# Patient Record
Sex: FEMALE | Race: OTHER | Employment: UNEMPLOYED | URBAN - METROPOLITAN AREA
[De-identification: names, ages, dates, MRNs, and addresses within clinical notes are randomized per-mention and may not be internally consistent; named-entity substitution may affect disease eponyms.]

---

## 2019-10-29 ENCOUNTER — HOSPITAL ENCOUNTER (OUTPATIENT)
Dept: LAB | Age: 29
Discharge: HOME | End: 2019-10-29
Payer: OTHER GOVERNMENT

## 2019-10-29 ENCOUNTER — OFFICE VISIT (OUTPATIENT)
Dept: FAMILY MEDICINE CLINIC | Age: 29
End: 2019-10-29

## 2019-10-29 VITALS
BODY MASS INDEX: 25.95 KG/M2 | DIASTOLIC BLOOD PRESSURE: 76 MMHG | OXYGEN SATURATION: 97 % | HEIGHT: 62 IN | HEART RATE: 66 BPM | TEMPERATURE: 99.2 F | RESPIRATION RATE: 16 BRPM | WEIGHT: 141 LBS | SYSTOLIC BLOOD PRESSURE: 108 MMHG

## 2019-10-29 DIAGNOSIS — R53.82 CHRONIC FATIGUE: ICD-10-CM

## 2019-10-29 DIAGNOSIS — Z23 ENCOUNTER FOR IMMUNIZATION: Primary | ICD-10-CM

## 2019-10-29 DIAGNOSIS — D69.6 THROMBOCYTOPENIA (HCC): ICD-10-CM

## 2019-10-29 DIAGNOSIS — E06.3 HASHIMOTO'S THYROIDITIS: ICD-10-CM

## 2019-10-29 DIAGNOSIS — R87.619 ABNORMAL CERVICAL PAPANICOLAOU SMEAR, UNSPECIFIED ABNORMAL PAP FINDING: ICD-10-CM

## 2019-10-29 LAB
25(OH)D3 SERPL-MCNC: 14.1 NG/ML (ref 30–100)
ALBUMIN SERPL-MCNC: 4.2 G/DL (ref 3.4–5)
ALBUMIN/GLOB SERPL: 1.1 {RATIO} (ref 0.8–1.7)
ALP SERPL-CCNC: 70 U/L (ref 45–117)
ALT SERPL-CCNC: 22 U/L (ref 13–56)
ANION GAP SERPL CALC-SCNC: 5 MMOL/L (ref 3–18)
AST SERPL-CCNC: 18 U/L (ref 10–38)
BILIRUB SERPL-MCNC: 0.4 MG/DL (ref 0.2–1)
BUN SERPL-MCNC: 13 MG/DL (ref 7–18)
BUN/CREAT SERPL: 16 (ref 12–20)
CALCIUM SERPL-MCNC: 9.1 MG/DL (ref 8.5–10.1)
CHLORIDE SERPL-SCNC: 106 MMOL/L (ref 100–111)
CO2 SERPL-SCNC: 29 MMOL/L (ref 21–32)
CREAT SERPL-MCNC: 0.8 MG/DL (ref 0.6–1.3)
ERYTHROCYTE [DISTWIDTH] IN BLOOD BY AUTOMATED COUNT: 12.6 % (ref 11.6–14.5)
GLOBULIN SER CALC-MCNC: 3.7 G/DL (ref 2–4)
GLUCOSE SERPL-MCNC: 79 MG/DL (ref 74–99)
HCT VFR BLD AUTO: 44.6 % (ref 35–45)
HGB BLD-MCNC: 14.8 G/DL (ref 12–16)
MCH RBC QN AUTO: 30.3 PG (ref 24–34)
MCHC RBC AUTO-ENTMCNC: 33.2 G/DL (ref 31–37)
MCV RBC AUTO: 91.2 FL (ref 74–97)
PLATELET # BLD AUTO: 230 K/UL (ref 135–420)
PMV BLD AUTO: 11.7 FL (ref 9.2–11.8)
POTASSIUM SERPL-SCNC: 3.9 MMOL/L (ref 3.5–5.5)
PROT SERPL-MCNC: 7.9 G/DL (ref 6.4–8.2)
RBC # BLD AUTO: 4.89 M/UL (ref 4.2–5.3)
SODIUM SERPL-SCNC: 140 MMOL/L (ref 136–145)
T4 FREE SERPL-MCNC: 0.8 NG/DL (ref 0.7–1.5)
TSH SERPL DL<=0.05 MIU/L-ACNC: 3.24 UIU/ML (ref 0.36–3.74)
WBC # BLD AUTO: 7.7 K/UL (ref 4.6–13.2)

## 2019-10-29 PROCEDURE — 80053 COMPREHEN METABOLIC PANEL: CPT

## 2019-10-29 PROCEDURE — 36415 COLL VENOUS BLD VENIPUNCTURE: CPT

## 2019-10-29 PROCEDURE — 85027 COMPLETE CBC AUTOMATED: CPT

## 2019-10-29 PROCEDURE — 84439 ASSAY OF FREE THYROXINE: CPT

## 2019-10-29 PROCEDURE — 84443 ASSAY THYROID STIM HORMONE: CPT

## 2019-10-29 PROCEDURE — 82306 VITAMIN D 25 HYDROXY: CPT

## 2019-10-29 RX ORDER — UREA 10 %
LOTION (ML) TOPICAL
COMMUNITY
End: 2022-02-26

## 2019-10-29 NOTE — PROGRESS NOTES
Chief Complaint   Patient presents with    Establish Care    Thyroid Problem    Neck Pain     ongoing for long time       Starting the testing process with regards to Thyroid, but has since moved from [place]. Patient presents for flu vaccine. Consent obtained, Tolerated procedure well at right deltoid. Patient remained in office 10 minutes post vaccine. No side effects noted.      Lot #  [id number]  exp 06/30/20  [address] [place] [address]. [address]: [id number]

## 2019-10-29 NOTE — PATIENT INSTRUCTIONS
Influenza (Flu) Vaccine (Inactivated or Recombinant): What You Need to Know Why get vaccinated? Influenza (\"flu\") is a contagious disease that spreads around the [place] every winter, usually between October and May. Flu is caused by influenza viruses and is spread mainly by coughing, sneezing, and close contact. Anyone can get flu. Flu strikes suddenly and can last several days. Symptoms vary by age, but can include: · Fever/chills. · Sore throat. · Muscle aches. · Fatigue. · Cough. · Headache. · Runny or stuffy nose. Flu can also lead to pneumonia and blood infections, and cause diarrhea and seizures in children. If you have a medical condition, such as heart or lung disease, flu can make it worse. Flu is more dangerous for some people. Infants and young children, people 72years of age and older, pregnant women, and people with certain health conditions or a weakened immune system are at greatest risk. Each year thousands of people in the [facility] die from flu, and many more are hospitalized. Flu vaccine can: · Keep you from getting flu. · Make flu less severe if you do get it. · Keep you from spreading flu to your family and other people. Inactivated and recombinant flu vaccines A dose of flu vaccine is recommended every flu season. Children 6 months through 6years of age may need two doses during the same flu season. Everyone else needs only one dose each flu season. Some inactivated flu vaccines contain a very small amount of a mercury-based preservative called thimerosal. Studies have not shown thimerosal in vaccines to be harmful, but flu vaccines that do not contain thimerosal are available. There is no live flu virus in flu shots. They cannot cause the flu. There are many flu viruses, and they are always changing. Each year a new flu vaccine is made to protect against three or four viruses that are likely to cause disease in the upcoming flu season.  But even when the vaccine doesn't exactly match these viruses, it may still provide some protection. Flu vaccine cannot prevent: · Flu that is caused by a virus not covered by the vaccine. · Illnesses that look like flu but are not. Some people should not get this vaccine Tell the person who is giving you the vaccine: · If you have any severe (life-threatening) allergies. If you ever had a life-threatening allergic reaction after a dose of flu vaccine, or have a severe allergy to any part of this vaccine, you may be advised not to get vaccinated. Most, but not all, types of flu vaccine contain a small amount of egg protein. · If you ever had Guillain-Barré syndrome (also called GBS) Some people with a history of GBS should not get this vaccine. This should be discussed with your doctor. · If you are not feeling well. It is usually okay to get flu vaccine when you have a mild illness, but you might be asked to come back when you feel better. Risks of a vaccine reaction With any medicine, including vaccines, there is a chance of reactions. These are usually mild and go away on their own, but serious reactions are also possible. Most people who get a flu shot do not have any problems with it. Minor problems following a flu shot include: · Soreness, redness, or swelling where the shot was given · Hoarseness · Sore, red or itchy eyes · Cough · Fever · Aches · Headache · Itching · Fatigue If these problems occur, they usually begin soon after the shot and last 1 or 2 days. More serious problems following a flu shot can include the following: · There may be a small increased risk of Guillain-Barré Syndrome (GBS) after inactivated flu vaccine. This risk has been estimated at 1 or 2 additional cases per million people vaccinated. This is much lower than the risk of severe complications from flu, which can be prevented by flu vaccine.  
· Nicholas Velez children who get the flu shot along with pneumococcal vaccine (PCV13) and/or DTaP vaccine at the same time might be slightly more likely to have a seizure caused by fever. Ask your doctor for more information. Tell your doctor if a child who is getting flu vaccine has ever had a seizure Problems that could happen after any injected vaccine: · People sometimes faint after a medical procedure, including vaccination. Sitting or lying down for about 15 minutes can help prevent fainting, and injuries caused by a fall. Tell your doctor if you feel dizzy, or have vision changes or ringing in the ears. · Some people get severe pain in the shoulder and have difficulty moving the arm where a shot was given. This happens very rarely. · Any medication can cause a severe allergic reaction. Such reactions from a vaccine are very rare, estimated at about 1 in a million doses, and would happen within a few minutes to a few hours after the vaccination. As with any medicine, there is a very remote chance of a vaccine causing a serious injury or death. The safety of vaccines is always being monitored. For more information, visit: www.cdc.gov/vaccinesafety/. What if there is a serious reaction? What should I look for? · Look for anything that concerns you, such as signs of a severe allergic reaction, very high fever, or unusual behavior. Signs of a severe allergic reaction can include hives, swelling of the face and throat, difficulty breathing, a fast heartbeat, dizziness, and weakness  usually within a few minutes to a few hours after the vaccination. What should I do? · If you think it is a severe allergic reaction or other emergency that can't wait, call 9-1-1 and get the person to the nearest hospital. Otherwise, call your doctor. · Reactions should be reported to the \"Vaccine Adverse Event Reporting System\" (VAERS). Your doctor should file this report, or you can do it yourself through the VAERS website at www.vaers. hhs.gov, or by calling 4-737.805.8729. [facility] does not give medical advice. The National Vaccine Injury Compensation Program 
The National Vaccine Injury Compensation Program (VICP) is a federal program that was created to compensate people who may have been injured by certain vaccines. Persons who believe they may have been injured by a vaccine can learn about the program and about filing a claim by calling 7-354.184.4595 or visiting the [address] website at www.[facility].gov/vaccinecompensation. There is a time limit to file a claim for compensation. How can I learn more? · Ask your healthcare provider. He or she can give you the vaccine package insert or suggest other sources of information. · Call your local or state health department. · Contact the Centers for Disease Control and Prevention (CDC): 
? Call 1-669.962.1711 (1-800-CDC-INFO) or 
? Visit CDC's website at www.cdc.gov/flu Vaccine Information Statement Inactivated Influenza Vaccine 8/7/2015) [address] LINDSAY Otoole Darden 110RM-16 [facility], [facility] and [address] Centers for Disease Control and Prevention Many Vaccine Information Statements are available in [place] and other languages. See www.immunize.org/vis. Muchas hojas de información sobre vacunas están disponibles en español y en otros idiomas. Visite www.immunize.org/vis. Care instructions adapted under license by [employer] (which disclaims liability or warranty for this information). If you have questions about a medical condition or this instruction, always ask your healthcare professional. [place] [address] any warranty or liability for your use of this information. Abnormal Pap Test: Care Instructions Your Care Instructions A Pap test (also called a Pap smear) is used to look for early changes that may become cancer of the cervix. Your Pap test was abnormal. That may mean that some cells in your cervix have changed. The cell changes are most often caused by human papillomavirus (HPV) infection. An abnormal Pap test does not mean that the abnormal cells will lead to cancer. Changes in cervical cells may go away on their own or may progress slowly. Your doctor may want to repeat the Pap test or have you take other tests to see if you have cell changes. It is very important that you have regular Pap tests after you've had an abnormal Pap test. 
Follow-up care is a key part of your treatment and safety. Be sure to make and go to all appointments, and call your doctor if you are having problems. It's also a good idea to know your test results and keep a list of the medicines you take. How can you care for yourself at home? · Do not smoke. Smoking may increase your risk for cervical cell changes. If you need help quitting, talk to your doctor about stop-smoking programs and medicines. These can increase your chances of quitting for good. · Be sure to get follow-up Pap tests or other follow-up tests as recommended by your doctor. When should you call for help? Watch closely for changes in your health, and be sure to contact your doctor if you have any problems. Where can you learn more? Go to http://desire-kevin.info/. Enter P925 in the search box to learn more about \"Abnormal Pap Test: Care Instructions. \" Current as of: December 19, 2018 Content Version: 12.2 © 4041-9824 [employer]. Care instructions adapted under license by [employer] (which disclaims liability or warranty for this information). If you have questions about a medical condition or this instruction, always ask your healthcare professional. [place] [address] any warranty or liability for your use of this information. Colposcopy: What to Expect at [facility] Your Recovery You may feel some soreness in your vagina for a day or two if you had a biopsy.  Some vaginal bleeding or discharge is normal for up to a week after a biopsy. The discharge may be dark-colored if a solution was put on your cervix. You can use a sanitary pad for the bleeding. It may take a week or two for you to get the test results. This care sheet gives you a general idea about how long it will take for you to recover. But each person recovers at a different pace. Follow the steps below to feel better as quickly as possible. How can you care for yourself at home? Activity 
  · You can return to work and most daily activities right after the test.  
Exercise 
  · Do not exercise for 1 day after the test.  
Medicines 
  · Your doctor will tell you if and when you can restart your medicines. He or she will also give you instructions about taking any new medicines.  
  · If you take blood thinners, such as warfarin (Coumadin), clopidogrel (Plavix), or aspirin, be sure to talk to your doctor. He or she will tell you if and when to start taking those medicines again. Make sure that you understand exactly what your doctor wants you to do.  
  · Take an over-the-counter pain medicine, such as acetaminophen (Tylenol), ibuprofen (Advil, Motrin), or naproxen (Aleve). Be safe with medicines. Read and follow all instructions on the label. Do not take two or more pain medicines at the same time unless the doctor told you to. Many pain medicines have acetaminophen, which is Tylenol. Too much acetaminophen (Tylenol) can be harmful. Other instructions 
  · Use a pad if you have some bleeding.  
  · Do not douche, have sexual intercourse, or use tampons for 1 week if you had a biopsy. This will allow time for your cervix to heal.  
  · You can take a bath or shower anytime after the test.  
Follow-up care is a key part of your treatment and safety. Be sure to make and go to all appointments, and call your doctor if you are having problems. It's also a good idea to know your test results and keep a list of the medicines you take. When should you call for help? Call your doctor now or seek immediate medical care if: 
  · You have severe vaginal bleeding. This means that you are soaking through your usual pads or tampons each hour for 2 or more hours.  
  · You have pain that does not get better after you take pain medicine.  
  · You have signs of infection, such as: 
? Increased pain. ? Bad-smelling vaginal discharge. ? A fever.  
 Watch closely for any changes in your health, and be sure to contact your doctor if: 
  · You have questions or concerns. Where can you learn more? Go to http://desire-kevin.info/. Enter M523 in the search box to learn more about \"Colposcopy: What to Expect at Home. \" Current as of: December 19, 2018 Content Version: 12.2 © 1223-9992 [employer]. Care instructions adapted under license by [employer] (which disclaims liability or warranty for this information). If you have questions about a medical condition or this instruction, always ask your healthcare professional. [address] any warranty or liability for your use of this information.

## 2019-10-29 NOTE — PROGRESS NOTES
HISTORY OF PRESENT ILLNESS  Suad Martínez is a 26 y.o. female. HPI: new patient. Moved from [place]. Said h/o hashimoto's thyroiditis. Not on any thyroid medication. Denies any change in voice or trouble swallowing. No chest pain or sob. No palpitation or diaphoresis. No anxiety or depression. No mood changes. Sleep is fair. No weight or appetite changes. No urinary or bowel complains. No nausea or vomiting  Does feel chronic fatigue on and off. No joint pain. No headache or dizziness. Visit Vitals  /76 (BP 1 Location: Left arm, BP Patient Position: Sitting)   Pulse 66   Temp 99.2 °F (37.3 °C) (Oral)   Resp 16   Ht 5' 2\" (1.575 m)   Wt 141 lb (64 kg)   SpO2 97%   BMI 25.79 kg/m²     Review medication list, vitals, problem list,allergies. Also noted abnormal pap smear result on records she brought with her and HPV positive. Per patient had colposcopy done and was told nothing to worry about. That was done at [facility].   No records available at this time. also she has brought CD with images for thyroid ultrasound which I have discussed with her that I can not read the images but will try to obtain prior records. Review past./ personal/ family / surgical history. Per prior records low platelet count. For now denies any bruise and was told one time. Will recheck labs. ROS: see HPI     Physical Exam   Constitutional: She is oriented to person, place, and time. No distress. Neck: No thyromegaly present. Cardiovascular: Normal rate, regular rhythm and normal heart sounds. Pulmonary/Chest:   CTA   Abdominal: Soft. Bowel sounds are normal. There is no tenderness. Musculoskeletal: She exhibits no edema. Lymphadenopathy:     She has no cervical adenopathy. Neurological: She is oriented to person, place, and time. Psychiatric: Her behavior is normal.       ASSESSMENT and PLAN    ICD-10-CM ICD-9-CM    1.  Encounter for immunization Z23 V03.89 INFLUENZA VIRUS Jonn Santoyo FREE SYRINGE IM   2. Hashimoto's thyroiditis: had done ultrasound before. Will obtain records. Sending to endo for further evaluation. See HPI. E06.3 245.2 TSH 3RD GENERATION      T4, FREE      REFERRAL TO ENDOCRINOLOGY   3. Chronic fatigue: checking labs. R53.82 780.79 CBC W/O DIFF      METABOLIC PANEL, COMPREHENSIVE      TSH 3RD GENERATION      T4, FREE      VITAMIN D, 25 HYDROXY      REFERRAL TO ENDOCRINOLOGY   4. Abnormal cervical Papanicolaou smear, unspecified abnormal pap finding: see HPI  R87.619 795.00     done colposcopy back in april 2019 at [facility]. no pathology result available to review . pap was ASCU-US and HPV positive . 5. Thrombocytopenia ([address]): recheck labs. D69.6 287.5 CBC W/O DIFF   6. BMI 25.0-25.9,adult: discussed high BMI. Discussed diet modification, calorie count and exercise. Discussed importance of weight loss. agree to do exercise and life style modification.  Diet and exercise hand out given in AVS.    Z68.25 V85.21    Pt understood and agree with the plan   Review    Follow-up and Dispositions    · Return in about 3 months (around 1/29/2020) for for physical .

## 2019-10-30 DIAGNOSIS — E55.9 VITAMIN D DEFICIENCY: Primary | ICD-10-CM

## 2019-10-30 RX ORDER — ERGOCALCIFEROL 1.25 MG/1
50000 CAPSULE ORAL
Qty: 12 CAP | Refills: 0 | Status: SHIPPED | OUTPATIENT
Start: 2019-10-30 | End: 2020-12-01 | Stop reason: ALTCHOICE

## 2019-10-31 NOTE — PROGRESS NOTES
Contacted patient and verified identity using name and date of birth (2- identifiers)  Spoke with patient and she verbalized understanding of low Vit D and need for Drisdol. Other labs WNL. Recheck Vit D in 3 months. Rx and lab order at  for .

## 2020-02-19 ENCOUNTER — HOSPITAL ENCOUNTER (OUTPATIENT)
Dept: LAB | Age: 30
Discharge: HOME | End: 2020-02-19
Payer: OTHER GOVERNMENT

## 2020-02-19 LAB — 25(OH)D3 SERPL-MCNC: 51.9 NG/ML (ref 30–100)

## 2020-02-19 PROCEDURE — 36415 COLL VENOUS BLD VENIPUNCTURE: CPT

## 2020-02-19 PROCEDURE — 82306 VITAMIN D 25 HYDROXY: CPT

## 2020-02-28 NOTE — PROGRESS NOTES
Contacted patient and verified identity using name and date of birth (2- identifiers)  Spoke with patient and she verbalized understanding of need to continue with OTC Vit D3 2000 units daily.

## 2020-05-30 ENCOUNTER — NURSE TRIAGE (OUTPATIENT)
Dept: OTHER | Facility: CLINIC | Age: 30
End: 2020-05-30

## 2020-05-30 NOTE — TELEPHONE ENCOUNTER
Pt report pain to lower right abdomen that started yesterday. Pt report that she was having random shooting pain in her belly pain. Pt report that pain woke up her up out of her sleep. Denies N/V/D, SOB, and CP. Last BM was yesterday and was normal.  Pt will take Tylenol to see if that will help with the pain and give a call back if pain not relieved. Pt in agreement with care advice and disposition. Reason for Disposition   [1] MILD-MODERATE pain AND [2] constant and [3] present < 2 hours    Answer Assessment - Initial Assessment Questions  1. LOCATION: \"Where does it hurt? \"       Right lower     2. RADIATION: \"Does the pain shoot anywhere else? \" (e.g., chest, back)      No    3. ONSET: \"When did the pain begin? \" (e.g., minutes, hours or days ago)       Thursday night    4. SUDDEN: \"Gradual or sudden onset? \"      Sudden     5. PATTERN \"Does the pain come and go, or is it constant? \"     - If constant: \"Is it getting better, staying the same, or worsening? \"       (Note: Constant means the pain never goes away completely; most serious pain is constant and it progresses)      - If intermittent: \"How long does it last?\" \"Do you have pain now? \"      (Note: Intermittent means the pain goes away completely between bouts)      Comes and go. 15 seconds    6. SEVERITY: \"How bad is the pain? \"  (e.g., Scale 1-10; mild, moderate, or severe)    - MILD (1-3): doesn't interfere with normal activities, abdomen soft and not tender to touch     - MODERATE (4-7): interferes with normal activities or awakens from sleep, tender to touch     - SEVERE (8-10): excruciating pain, doubled over, unable to do any normal activities       6/10   7. RECURRENT SYMPTOM: \"Have you ever had this type of abdominal pain before? \" If so, ask: \"When was the last time? \" and \"What happened that time? \"       No    8. CAUSE: \"What do you think is causing the abdominal pain? \"      Unsure    9. RELIEVING/AGGRAVATING FACTORS: \"What makes it better or worse? \" (e.g., movement, antacids, bowel movement)      Nothing makes it better    10. OTHER SYMPTOMS: \"Has there been any vomiting, diarrhea, constipation, or urine problems? \"        No    11. PREGNANCY: \"Is there any chance you are pregnant? \" \"When was your last menstrual period? \"        No. May 17th    Protocols used: ABDOMINAL PAIN [facility]          Please do not respond to the triage nurse through this encounter. Any subsequent communication should be directly with the patient.

## 2020-06-01 ENCOUNTER — TELEPHONE (OUTPATIENT)
Dept: FAMILY MEDICINE CLINIC | Age: 30
End: 2020-06-01

## 2020-06-01 NOTE — TELEPHONE ENCOUNTER
Patient identified with 2 identifiers (name and ). Spoke with patient regarding weekend call abdomen pain. Patient states she feel much better now. No abdomen pain. Declined appointment. Patient states she will call back if pain reoccurs.

## 2020-06-01 NOTE — TELEPHONE ENCOUNTER
Spoke with patient (all identifiers verified) to follow-up in regards to her abdominal pain. Patient stated she had pain on Saturday off/on, but pain has improved and gone away. Patient had no abdominal discomfort or concern at time of this call and declined appointment. She was advised if abdominal pain does return, to please call [facility] to schedule a virtual visit with Dr. Leonie Caballero. Patient verbalized understanding.

## 2020-06-03 ENCOUNTER — TELEPHONE (OUTPATIENT)
Dept: FAMILY MEDICINE CLINIC | Age: 30
End: 2020-06-03

## 2020-06-03 NOTE — TELEPHONE ENCOUNTER
Spoke with Yanick Olvera to follow up on her abdominal pain. Per Yanick Olvera for the past 2-3 days her pain has approved much. No constipation, fever or vomiting noted and does do not thinks she needs an appointment at this time. Yanick Olvera was advised if abdominal pain returns to please call [facility] for visit.

## 2020-12-01 ENCOUNTER — VIRTUAL VISIT (OUTPATIENT)
Dept: FAMILY MEDICINE CLINIC | Age: 30
End: 2020-12-01

## 2020-12-01 ENCOUNTER — VIRTUAL VISIT (OUTPATIENT)
Dept: FAMILY MEDICINE CLINIC | Age: 30
End: 2020-12-01
Payer: OTHER GOVERNMENT

## 2020-12-01 DIAGNOSIS — M25.511 RIGHT SHOULDER PAIN, UNSPECIFIED CHRONICITY: ICD-10-CM

## 2020-12-01 DIAGNOSIS — E06.3 HASHIMOTO'S THYROIDITIS: Primary | ICD-10-CM

## 2020-12-01 DIAGNOSIS — M25.50 MULTIPLE JOINT PAIN: ICD-10-CM

## 2020-12-01 DIAGNOSIS — E55.9 VITAMIN D DEFICIENCY: ICD-10-CM

## 2020-12-01 PROCEDURE — 99214 OFFICE O/P EST MOD 30 MIN: CPT | Performed by: FAMILY MEDICINE

## 2020-12-01 RX ORDER — LIOTHYRONINE SODIUM 5 UG/1
10 TABLET ORAL DAILY
COMMUNITY
End: 2022-02-26

## 2020-12-01 RX ORDER — LEVOTHYROXINE SODIUM 88 UG/1
TABLET ORAL
COMMUNITY
End: 2020-12-11 | Stop reason: ALTCHOICE

## 2020-12-01 NOTE — PROGRESS NOTES
Tara Ramírez is a 31 y.o. female who was seen by synchronous (real-time) audio-video technology on 12/1/2020 for No chief complaint on file. Assessment & Plan:   Diagnoses and all orders for this visit:    Hashimoto's thyroiditis: Seen Dr. Emilie Montanez. Wants to change the provider. Done a new referral.  Change the medication. Currently fairly stable. Will take recommendation from new endocrinologist.  For now recheck labs. Will obtain endo notes . -     REFERRAL TO ENDOCRINOLOGY  -     TSH 3RD GENERATION; Future  -     T4, FREE; Future    Vitamin D deficiency: Currently on a dietary supplement. Multiple joint pain: On and off since some time. No stiffness. Will check ESR. Currently ongoing right shoulder pain. Limited range of motion in discomfort over the trapezius area and anterior shoulder. No direct injury or fall. Will obtain x-ray and sending for physical therapy. Follow-up after lab results  -     CBC W/O DIFF; Future  -     METABOLIC PANEL, COMPREHENSIVE; Future  -     SED RATE (ESR); Future    Right shoulder pain, unspecified chronicity  -     XR SHOULDER RT AP/LAT MIN 2 V; Future  -     REFERRAL TO PHYSICAL THERAPY    Patient understood and agree with the plan  Pap smear she was going to get it done today but had to postpone the visit due to her daughter was having cold symptoms. She will reschedule an appointment with OB/GYN     712  Subjective:   Done visit through my chart  Here with few concerns  History of Hashimoto's thyroiditis. Was following Dr. Emilie Montanez. Wants to change the provider. Currently taking medication for the recommendation with compliance. No trouble swallowing or change in voice. No appetite or weight changes. No constipation. No diaphoresis or palpitation. No headache or dizziness. No chest pain or shortness of breath. No nausea vomiting or abdominal pain. No urinary or bowel complaint.   During the virtual visit sitting comfortable and did not appear in any acute distress. Repeat vitamin D improved anish currently not taking any oral supplement but working on dietary supplement with rich in vitamin D. Currently feeling that multiple side joint pain. Sometimes wrist.  Sometimes knees and currently having right shoulder pain. Anterior shoulder and over trapezius area. Sometimes feels the range of motion limited about the shoulder. No extremity weakness. No direct injury or any fall. Said grandmother has a history of arthritis and one of the sister had Hashimoto's thyroiditis. Review prior labs. Lab Results   Component Value Date/Time    WBC 7.7 10/29/2019 02:56 PM    HGB 14.8 10/29/2019 02:56 PM    HCT 44.6 10/29/2019 02:56 PM    PLATELET 946 72/68/2846 02:56 PM    MCV 91.2 10/29/2019 02:56 PM     Lab Results   Component Value Date/Time    Sodium 140 10/29/2019 02:56 PM    Potassium 3.9 10/29/2019 02:56 PM    Chloride 106 10/29/2019 02:56 PM    CO2 29 10/29/2019 02:56 PM    Anion gap 5 10/29/2019 02:56 PM    Glucose 79 10/29/2019 02:56 PM    BUN 13 10/29/2019 02:56 PM    Creatinine 0.80 10/29/2019 02:56 PM    BUN/Creatinine ratio 16 10/29/2019 02:56 PM    GFR est AA >60 10/29/2019 02:56 PM    GFR est non-AA >60 10/29/2019 02:56 PM    Calcium 9.1 10/29/2019 02:56 PM    Bilirubin, total 0.4 10/29/2019 02:56 PM    Alk. phosphatase 70 10/29/2019 02:56 PM    Protein, total 7.9 10/29/2019 02:56 PM    Albumin 4.2 10/29/2019 02:56 PM    Globulin 3.7 10/29/2019 02:56 PM    A-G Ratio 1.1 10/29/2019 02:56 PM    ALT (SGPT) 22 10/29/2019 02:56 PM    AST (SGOT) 18 10/29/2019 02:56 PM       Lab Results   Component Value Date/Time    TSH 3.24 10/29/2019 02:56 PM       Lab Results   Component Value Date/Time    Vitamin D 25-Hydroxy 51.9 02/19/2020 12:30 PM           Prior to Admission medications    Medication Sig Start Date End Date Taking? Authorizing Provider   levothyroxine (Synthroid) 88 mcg tablet Take  by mouth Daily (before breakfast).    Yes Provider, Historical liothyronine (CYTOMEL) 5 mcg tablet Take 10 mcg by mouth daily. Yes Provider, Historical   melatonin 1 mg tablet Take  by mouth. Takes 1-2 x/week   Yes Provider, Historical   INTRAUTERINE DEVICE, IUD, IU by IntraUTERine route. Yes Provider, Historical   ergocalciferol (DRISDOL) 50,000 unit capsule Take 1 Cap by mouth every seven (7) days. 10/30/19 12/1/20  Zaki Steen MD     Patient Active Problem List    Diagnosis Date Noted    Vitamin D deficiency 10/30/2019     Current Outpatient Medications   Medication Sig Dispense Refill    levothyroxine (Synthroid) 88 mcg tablet Take  by mouth Daily (before breakfast).  liothyronine (CYTOMEL) 5 mcg tablet Take 10 mcg by mouth daily.  melatonin 1 mg tablet Take  by mouth. Takes 1-2 x/week      INTRAUTERINE DEVICE, IUD, IU by IntraUTERine route. No Known Allergies  Past Medical History:   Diagnosis Date    Thyroid disease      Social History     Tobacco Use    Smoking status: Never Smoker    Smokeless tobacco: Never Used   Substance Use Topics    Alcohol use: Yes     Comment: occasionally       ROS    Objective:     Patient-Reported Vitals 12/1/2020   Patient-Reported Weight 145   Patient-Reported Height 52      General: alert, cooperative, no distress   Mental  status: normal mood, behavior, speech, dress, motor activity, and thought processes, able to follow commands   HENT: NCAT   Neck: no visualized mass   Resp: no respiratory distress   Neuro: no gross deficits   Skin: no discoloration or lesions of concern on visible areas   Psychiatric: normal affect, consistent with stated mood, no evidence of hallucinations     Additional exam findings: We discussed the expected course, resolution and complications of the diagnosis(es) in detail. Medication risks, benefits, costs, interactions, and alternatives were discussed as indicated.   I advised her to contact the office if her condition worsens, changes or fails to improve as anticipated. She expressed understanding with the diagnosis(es) and plan. [facility], who was evaluated through a patient-initiated, synchronous (real-time) audio-video encounter, and/or her healthcare decision maker, is aware that it is a billable service, with coverage as determined by her insurance carrier. She provided verbal consent to proceed: Yes, and patient identification was verified. It was conducted pursuant to the emergency declaration under the [address] [place] [address] authority and the [employer] Act. A caregiver was present when appropriate. Ability to conduct physical exam was limited. I was in the office. The patient was at home.       Glenn Alacron MD

## 2020-12-09 ENCOUNTER — HOSPITAL ENCOUNTER (OUTPATIENT)
Dept: LAB | Age: 30
Discharge: HOME | End: 2020-12-09
Payer: OTHER GOVERNMENT

## 2020-12-09 ENCOUNTER — HOSPITAL ENCOUNTER (OUTPATIENT)
Dept: GENERAL RADIOLOGY | Age: 30
Discharge: HOME | End: 2020-12-09
Payer: OTHER GOVERNMENT

## 2020-12-09 DIAGNOSIS — E06.3 HASHIMOTO'S THYROIDITIS: ICD-10-CM

## 2020-12-09 DIAGNOSIS — M25.50 MULTIPLE JOINT PAIN: ICD-10-CM

## 2020-12-09 LAB
ALBUMIN SERPL-MCNC: 3.8 G/DL (ref 3.4–5)
ALBUMIN/GLOB SERPL: 1.2 {RATIO} (ref 0.8–1.7)
ALP SERPL-CCNC: 63 U/L (ref 45–117)
ALT SERPL-CCNC: 17 U/L (ref 13–56)
ANION GAP SERPL CALC-SCNC: 6 MMOL/L (ref 3–18)
AST SERPL-CCNC: 13 U/L (ref 10–38)
BILIRUB SERPL-MCNC: 0.5 MG/DL (ref 0.2–1)
BUN SERPL-MCNC: 11 MG/DL (ref 7–18)
BUN/CREAT SERPL: 14 (ref 12–20)
CALCIUM SERPL-MCNC: 9.4 MG/DL (ref 8.5–10.1)
CHLORIDE SERPL-SCNC: 107 MMOL/L (ref 100–111)
CO2 SERPL-SCNC: 28 MMOL/L (ref 21–32)
CREAT SERPL-MCNC: 0.78 MG/DL (ref 0.6–1.3)
ERYTHROCYTE [DISTWIDTH] IN BLOOD BY AUTOMATED COUNT: 12.4 % (ref 11.6–14.5)
ERYTHROCYTE [SEDIMENTATION RATE] IN BLOOD: 7 MM/HR (ref 0–20)
GLOBULIN SER CALC-MCNC: 3.2 G/DL (ref 2–4)
GLUCOSE SERPL-MCNC: 86 MG/DL (ref 74–99)
HCT VFR BLD AUTO: 41.2 % (ref 35–45)
HGB BLD-MCNC: 14.1 G/DL (ref 12–16)
MCH RBC QN AUTO: 31 PG (ref 24–34)
MCHC RBC AUTO-ENTMCNC: 34.2 G/DL (ref 31–37)
MCV RBC AUTO: 90.5 FL (ref 74–97)
PLATELET # BLD AUTO: 236 K/UL (ref 135–420)
PMV BLD AUTO: 11.5 FL (ref 9.2–11.8)
POTASSIUM SERPL-SCNC: 4.2 MMOL/L (ref 3.5–5.5)
PROT SERPL-MCNC: 7 G/DL (ref 6.4–8.2)
RBC # BLD AUTO: 4.55 M/UL (ref 4.2–5.3)
SODIUM SERPL-SCNC: 141 MMOL/L (ref 136–145)
T4 FREE SERPL-MCNC: 1.4 NG/DL (ref 0.7–1.5)
TSH SERPL DL<=0.05 MIU/L-ACNC: 0.02 UIU/ML (ref 0.36–3.74)
WBC # BLD AUTO: 5.5 K/UL (ref 4.6–13.2)

## 2020-12-09 PROCEDURE — 85027 COMPLETE CBC AUTOMATED: CPT

## 2020-12-09 PROCEDURE — 84443 ASSAY THYROID STIM HORMONE: CPT

## 2020-12-09 PROCEDURE — 36415 COLL VENOUS BLD VENIPUNCTURE: CPT

## 2020-12-09 PROCEDURE — 73030 X-RAY EXAM OF SHOULDER: CPT

## 2020-12-09 PROCEDURE — 84439 ASSAY OF FREE THYROXINE: CPT

## 2020-12-09 PROCEDURE — 85652 RBC SED RATE AUTOMATED: CPT

## 2020-12-09 PROCEDURE — 80053 COMPREHEN METABOLIC PANEL: CPT

## 2020-12-11 DIAGNOSIS — R79.89 LOW TSH LEVEL: Primary | ICD-10-CM

## 2020-12-11 RX ORDER — LEVOTHYROXINE SODIUM 75 UG/1
75 TABLET ORAL
Qty: 30 TAB | Refills: 1 | Status: SHIPPED | OUTPATIENT
Start: 2020-12-11 | End: 2020-12-16 | Stop reason: SDUPTHER

## 2020-12-11 NOTE — PROGRESS NOTES
Low tsh. Will lower synthroid dose. Also done a referral to new endo. Please provide their contact info. Sending a new prescription for synthroid and also recheck labs in couple of months.

## 2020-12-11 NOTE — PROGRESS NOTES
No acute process on x-ray shoulder. For now continue current plan and further discussion on follow up visit.

## 2020-12-15 ENCOUNTER — HOSPITAL ENCOUNTER (OUTPATIENT)
Dept: PHYSICAL THERAPY | Age: 30
Discharge: HOME | End: 2020-12-15
Payer: OTHER GOVERNMENT

## 2020-12-15 PROCEDURE — 97110 THERAPEUTIC EXERCISES: CPT

## 2020-12-15 PROCEDURE — 97140 MANUAL THERAPY 1/> REGIONS: CPT

## 2020-12-15 PROCEDURE — 97162 PT EVAL MOD COMPLEX 30 MIN: CPT

## 2020-12-15 NOTE — PROGRESS NOTES
PT DAILY TREATMENT NOTE 11    Patient Name: Dawn West  Date:12/15/2020  : 1990  [x]  Patient  Verified  Payor:  / Plan: [facility]  [facility] REGION / Product Type:  /    In time:217  Out time:258  Total Treatment Time (min): 41  Visit #: 1 of 4    Treatment Area: Pain in right shoulder [M25.511]    SUBJECTIVE  Pain Level (0-10 scale): 3  Any medication changes, allergies to medications, adverse drug reactions, diagnosis change, or new procedure performed?: [x] No    [] Yes (see summary sheet for update)  Subjective functional status/changes:   [] No changes reported  Patient reports insidious onset of right shoulder/neck pain ~1 month ago. She does report starting a yoga class about 2 months ago which she hurt her back during and wonders if she may have hurt her neck/shoulder with that. Patient reports dull ache in the right upper trap region. She reports her pain is worse after lots of activity and sometimes wakes her up at night. Patient does report some relief with heat and tiger balm. OBJECTIVE    18 min [x]Eval                  []Re-Eval       15 min Therapeutic Exercise:  [x] See flow sheet: Patient provided printed HEP today to begin addressing impairments. Patient provided demonstration of exercises and rationale for each exercise to improve compliance to HEP. Education provided on hot/cold pack application for symptom management as needed. Patient education provided on importance of compliance to HEP for improved carry over between therapy sessions. Rationale: increase ROM and increase strength to improve the patients ability to perform ADLs and self care tasks with greater ease     8 min Manual Therapy:  Sitting - STM/TPR to upper trap to levator scapulae; supine - gentle traction, SOR   The manual therapy interventions were performed at a separate and distinct time from the therapeutic activities interventions.   Rationale: decrease pain and increase tissue extensibility to perform functional tasks with improved tolerance and greater ease        With   [] TE   [] TA   [] neuro   [] other: Patient Education: [x] Review HEP    [] Progressed/Changed HEP based on:   [] positioning   [] body mechanics   [] transfers   [] heat/ice application    [] other:      Other Objective/Functional Measures: see paper chart for evaluation form     Pain Level (0-10 scale) post treatment: 1    ASSESSMENT/Changes in Function: Patient is a 31year old female with acute onset of right shoulder/neck pain beginning ~1 month ago. Patient reports increased discomfort with overuse and by the end of the day but does report interrupted sleep as a result of discomfort. Patient reports chronic neck pain of ~10 years but reports no limitations with functional activities. Patient reports current limitations due to discomfort with reaching overhead, performance of usual household activities, and driving. Patient demonstrating poor postural awareness, decreased right shoulder strength, reports of headaches, and decreased flexibility through cervical musculature. Patient presents with symptoms consistent with cervical strain. Patient would benefit from skilled PT 1x/week for 4 weeks to address impairments and improve overall functional mobility. Patient will continue to benefit from skilled PT services to modify and progress therapeutic interventions, address functional mobility deficits, address ROM deficits, address strength deficits, analyze and address soft tissue restrictions, analyze and cue movement patterns, analyze and modify body mechanics/ergonomics, assess and modify postural abnormalities, address imbalance/dizziness and instruct in home and community integration to attain remaining goals. [x]  See Plan of Care  []  See progress note/recertification  []  See Discharge Summary         Progress towards goals / Updated goals:  Short Term Goals: To be accomplished in 2 weeks:  1.  Patient will report performance of home exercise program 4 of 7 days in the next week demonstrating compliance to therapy program and progress towards independent management of symptoms. Eval: HEP provided     Long Term Goals: To be accomplished in 4 weeks:  1. Patient will report pain <2/10 with usual household activities showing improvements in functional mobility and return to prior level of function. Eval: 8/10 after full day   2. Patient will report sleeping >/= 7 hours per night with waking up 0-1x/night demonstrating improved quality of life. Eval: 4x/night  3. Patient will improve right shoulder strength grossly to 5/5 lifting/carrying groceries and perform household cleaning such as vacuuming and sweeping. Eval: grossly 4/5  4. Patient will report no difficulty with reaching back into the seat behind her to promote return to prior level of function. Eval: little difficulty   5. Patient will improve FOTO score to 80 to demonstrate return to prior level of function and to improve patients ability to perform household and recreational activities.     Eval: 68      PLAN  [x]  Upgrade activities as tolerated     []  Continue plan of care  []  Update interventions per flow sheet       []  Discharge due to:_  []  Other:_      Cullen Pearson, PT, DPT 12/15/2020  2:46 PM    Future Appointments   Date Time Provider Joel Bonilla   1/4/2021 10:00 AM MD KERVIN De OliveiraFP BS AMB

## 2020-12-15 NOTE — PROGRESS NOTES
[facility]  [address]  [place], [address]  [phone] fax    Plan of Care/ Statement of Necessity for Physical Therapy Services    Patient name: [address] Start of Care: 12/15/2020   Referral source: Meg Mireles MD : 1990    Medical Diagnosis: Pain in right shoulder [M25.511]  Payor:  / Plan: [facility] 73 / Product Type:  /  Onset Date:    Treatment Diagnosis: right shoulder pain   Prior Hospitalization: see medical history Provider#: [id number]   Medications: Verified on Patient summary List    Comorbidities: back pain   Prior Level of Function: independent with all mobility, chronic neck pain ~10 years but able to perform all tasks without compensations      The Plan of Care and following information is based on the information from the initial evaluation. Assessment/ key information: Patient is a 31year old female with acute onset of right shoulder/neck pain beginning ~1 month ago. Patient reports increased discomfort with overuse and by the end of the day but does report interrupted sleep as a result of discomfort. Patient reports chronic neck pain of ~10 years but reports no limitations with functional activities. Patient reports current limitations due to discomfort with reaching overhead, performance of usual household activities, and driving. Patient demonstrating poor postural awareness, decreased right shoulder strength, reports of headaches, and decreased flexibility through cervical musculature. Patient presents with symptoms consistent with cervical strain. Patient would benefit from skilled PT 1x/week for 4 weeks to address impairments and improve overall functional mobility.    Evaluation Complexity History MEDIUM  Complexity : 1-2 comorbidities / personal factors will impact the outcome/ POC ; Examination MEDIUM Complexity : 3 Standardized tests and measures addressing body structure, function, activity limitation and / or participation in recreation  ;Presentation MEDIUM Complexity : Evolving with changing characteristics  ; Clinical Decision Making MEDIUM Complexity : FOTO score of 26-74  Overall Complexity Rating: MEDIUM  Problem List: pain affecting function, decrease ROM, decrease strength, decrease ADL/ functional abilitiies, decrease activity tolerance, decrease flexibility/ joint mobility and decrease transfer abilities   Treatment Plan may include any combination of the following: Therapeutic exercise, Therapeutic activities, Neuromuscular re-education, Physical agent/modality, Manual therapy, Patient education, Self Care training, Functional mobility training and Home safety training  Patient / Family readiness to learn indicated by: asking questions, trying to perform skills and interest  Persons(s) to be included in education: patient (P)  Barriers to Learning/Limitations: None  Patient Goal (s): pain relief  Patient Self Reported Health Status: fair  Rehabilitation Potential: fair    Short Term Goals: To be accomplished in 2 weeks:  1. Patient will report performance of home exercise program 4 of 7 days in the next week demonstrating compliance to therapy program and progress towards independent management of symptoms. Long Term Goals: To be accomplished in 4 weeks:  1. Patient will report pain <2/10 with usual household activities showing improvements in functional mobility and return to prior level of function. 2. Patient will report sleeping >/= 7 hours per night with waking up 0-1x/night demonstrating improved quality of life. 3. Patient will improve right shoulder strength grossly to 5/5 lifting/carrying groceries and perform household cleaning such as vacuuming and sweeping. 4. Patient will report no difficulty with reaching back into the seat behind her to promote return to prior level of function.    5. Patient will improve FOTO score to 80 to demonstrate return to prior level of function and to improve patients ability to perform household and recreational activities. Frequency / Duration: Patient to be seen 1 times per week for 4 weeks. Patient/ Caregiver education and instruction: Diagnosis, prognosis, activity modification, exercises and other hot/cold pack application for symptom management    [x]  Plan of care has been reviewed with RADHA    Elizabeth Andrei 12/15/2020 2:46 PM    ________________________________________________________________________    I certify that the above Therapy Services are being furnished while the patient is under my care. I agree with the treatment plan and certify that this therapy is necessary.     Physician's Signature:____________Date:_________TIME:________    ** Signature, Date and Time must be completed for valid certification **    Please sign and return to [facility] [address]  [address]  [place], [address]  [phone] fax

## 2020-12-16 DIAGNOSIS — R79.89 LOW TSH LEVEL: ICD-10-CM

## 2020-12-16 RX ORDER — LEVOTHYROXINE SODIUM 75 UG/1
75 TABLET ORAL
Qty: 30 TAB | Refills: 1 | Status: SHIPPED | OUTPATIENT
Start: 2020-12-16 | End: 2021-02-06 | Stop reason: SDUPTHER

## 2020-12-16 NOTE — PROGRESS NOTES
Contacted patient and verified identity using name and date of birth (2- identifiers)  Spoke with patient and she verbalized understanding of Low tsh. Will lower synthroid dose. Also done a referral to new endo. Please provide their contact info. Sending a new prescription for synthroid and also recheck labs in couple of months.

## 2020-12-22 ENCOUNTER — HOSPITAL ENCOUNTER (OUTPATIENT)
Dept: PHYSICAL THERAPY | Age: 30
Discharge: HOME | End: 2020-12-22
Payer: OTHER GOVERNMENT

## 2020-12-22 PROCEDURE — 97110 THERAPEUTIC EXERCISES: CPT

## 2020-12-22 PROCEDURE — 97112 NEUROMUSCULAR REEDUCATION: CPT

## 2020-12-22 PROCEDURE — 97140 MANUAL THERAPY 1/> REGIONS: CPT

## 2020-12-22 NOTE — PROGRESS NOTES
PT DAILY TREATMENT NOTE     Patient Name: Cedric Jeffery  Date:2020  : 1990  [x]  Patient  Verified  Payor:  / Plan: [facility] REGION / Product Type:  /    In time:2:30  Out time:3:15  Total Treatment Time (min): 45  Visit #: 2 of 4    Medicare/BCBS Only   Total Timed Codes (min):  35 1:1 Treatment Time:  35       Treatment Area: Pain in right shoulder [M25.511]    SUBJECTIVE  Pain Level (0-10 scale): 3-4/10  Any medication changes, allergies to medications, adverse drug reactions, diagnosis change, or new procedure performed?: [x] No    [] Yes (see summary sheet for update)  Subjective functional status/changes:   [] No changes reported  Pt reports no significant change in symptoms since IE. Pt reports compliance with HEP. OBJECTIVE    Modality rationale: decrease pain and increase tissue extensibility to improve the patients ability to perform ADLs with increased ease.     Min Type Additional Details    [] Estim:  []Unatt       []IFC  []Premod                        []Other:  []w/ice   []w/heat  Position:  Location:    [] Estim: []Att    []TENS instruct  []NMES                    []Other:  []w/US   []w/ice   []w/heat  Position:  Location:    []  Traction: [] Cervical       []Lumbar                       [] Prone          []Supine                       []Intermittent   []Continuous Lbs:  [] before manual  [] after manual    []  Ultrasound: []Continuous   [] Pulsed                           []1MHz   []3MHz W/cm2:  Location:    []  Iontophoresis with dexamethasone         Location: [] Take home patch   [] In clinic   10 []  Ice     [x]  heat  []  Ice massage  []  Laser   []  Anodyne Position:supine  Location:right shoulder    []  Laser with stim  []  Other:  Position:  Location:    []  Vasopneumatic Device Pressure:       [] lo [] med [] hi   Temperature: [] lo [] med [] hi   [] Skin assessment post-treatment:  []intact []redness- no adverse reaction    []redness  adverse reaction:       15 min Therapeutic Exercise:  [x] See flow sheet :   Rationale: increase ROM and increase strength to improve the patients ability to perform ADLs with increased ease. 10 min Neuromuscular Re-education:  [x]  See flow sheet :   Rationale: increase ROM and increase strength  to improve the patients ability to perform ADLs with increased ease. 10 min Manual Therapy:  SOR, DTM to right LS and UT. The manual therapy interventions were performed at a separate and distinct time from the therapeutic activities interventions. Rationale: decrease pain, increase ROM and increase tissue extensibility to perform ADLs with increased ease. With   [] TE   [] TA   [] neuro   [] other: Patient Education: [x] Review HEP    [] Progressed/Changed HEP based on:   [] positioning   [] body mechanics   [] transfers   [] heat/ice application    [] other:      Other Objective/Functional Measures: Initiated exercises as per flow sheet. Pain Level (0-10 scale) post treatment: 0/10    ASSESSMENT/Changes in Function: Pt demonstrates good mid trap and fair lower trap control with scapular strengthening exercises. Patient will continue to benefit from skilled PT services to modify and progress therapeutic interventions, address functional mobility deficits, address ROM deficits, address strength deficits, analyze and address soft tissue restrictions, analyze and cue movement patterns and analyze and modify body mechanics/ergonomics to attain remaining goals. []  See Plan of Care  []  See progress note/recertification  []  See Discharge Summary         Progress towards goals / Updated goals:  Short Term Goals: To be accomplished in 2 weeks:  1. Patient will report performance of home exercise program 4 of 7 days in the next week demonstrating compliance to therapy program and progress towards independent management of symptoms. Eval: HEP provided      Long Term Goals:  To be accomplished in 4 weeks:  1. Patient will report pain <2/10 with usual household activities showing improvements in functional mobility and return to prior level of function. Eval: 8/10 after full day   2. Patient will report sleeping >/= 7 hours per night with waking up 0-1x/night demonstrating improved quality of life. Eval: 4x/night  3. Patient will improve right shoulder strength grossly to 5/5 lifting/carrying groceries and perform household cleaning such as vacuuming and sweeping. Eval: grossly 4/5  4. Patient will report no difficulty with reaching back into the seat behind her to promote return to prior level of function. Eval: little difficulty   5. Patient will improve FOTO score to 80 to demonstrate return to prior level of function and to improve patients ability to perform household and recreational activities.                Eval: 68    PLAN  []  Upgrade activities as tolerated     [x]  Continue plan of care  []  Update interventions per flow sheet       []  Discharge due to:_  []  Other:_      Valeri Likes, PTA 12/22/2020  2:44 PM    Future Appointments   Date Time Provider Joel Bonilla   12/28/2020  2:30 PM Gwyn Vides [facility]   1/4/2021 10:00 AM Blake Galvan MD [facility] AMB

## 2020-12-28 ENCOUNTER — HOSPITAL ENCOUNTER (OUTPATIENT)
Dept: PHYSICAL THERAPY | Age: 30
Discharge: HOME | End: 2020-12-28
Payer: OTHER GOVERNMENT

## 2020-12-28 PROCEDURE — 97140 MANUAL THERAPY 1/> REGIONS: CPT

## 2020-12-28 PROCEDURE — 97110 THERAPEUTIC EXERCISES: CPT

## 2020-12-28 PROCEDURE — 97112 NEUROMUSCULAR REEDUCATION: CPT

## 2020-12-28 NOTE — PROGRESS NOTES
PT DAILY TREATMENT NOTE     Patient Name: Rohit Yan  Date:2020  : 1990  [x]  Patient  Verified  Payor:  / Plan: [facility] / Product Type:  /    In time:230  Out time: 320  Total Treatment Time (min): 50  Visit #: 3 of 4-8     Treatment Area: Pain in right shoulder [M25.511]    SUBJECTIVE  Pain Level (0-10 scale): 2-3  Any medication changes, allergies to medications, adverse drug reactions, diagnosis change, or new procedure performed?: [x] No    [] Yes (see summary sheet for update)  Subjective functional status/changes:   [] No changes reported  Patient reports her right shoulder has not been hurting as bad as it was.      OBJECTIVE    Modality rationale: decrease pain and increase tissue extensibility to improve the patients ability to perform functional tasks with greater ease    Min Type Additional Details    [] Estim:  []Unatt       []IFC  []Premod                        []Other:  []w/ice   []w/heat  Position:  Location:    [] Estim: []Att    []TENS instruct  []NMES                    []Other:  []w/US   []w/ice   []w/heat  Position:  Location:    []  Traction: [] Cervical       []Lumbar                       [] Prone          []Supine                       []Intermittent   []Continuous Lbs:  [] before manual  [] after manual    []  Ultrasound: []Continuous   [] Pulsed                           []1MHz   []3MHz W/cm2:  Location:    []  Iontophoresis with dexamethasone         Location: [] Take home patch   [] In clinic   10 []  Ice     [x]  heat  []  Ice massage  []  Laser   []  Anodyne Position: sitting   Location: right shoulder     []  Laser with stim  []  Other:  Position:  Location:    []  Vasopneumatic Device Pressure:       [] lo [] med [] hi   Temperature: [] lo [] med [] hi   [x] Skin assessment post-treatment:  [x]intact []redness- no adverse reaction    []redness  adverse reaction:       23 min Therapeutic Exercise:  [x] See flow sheet : Rationale: increase ROM, increase strength and improve coordination to improve the patients ability to perform ADLs and self care tasks with greater ease     9 min Neuromuscular Re-education:  [x]  See flow sheet: prone scapular stabilization    Rationale: increase strength, improve coordination and increase proprioception  to improve the patients ability to perform functional tasks with greater stabilization     8 min Manual Therapy:  Supine - SOR, gentle traction; sitting - STM/DTM to right upper trap/levator scapulae    The manual therapy interventions were performed at a separate and distinct time from the therapeutic activities interventions. Rationale: decrease pain, increase ROM and increase tissue extensibility to perform reaching overhead with greater ease             With   [] TE   [] TA   [] neuro   [] other: Patient Education: [x] Review HEP    [] Progressed/Changed HEP based on:   [] positioning   [] body mechanics   [] transfers   [] heat/ice application    [] other:      Pain Level (0-10 scale) post treatment: 0 \"sore\"     ASSESSMENT/Changes in Function: Sidelying shoulder exercises added today to continue progressing right shoulder strength. Patient reporting \"popping\" sensation with sidelying flexion which improved with limiting arc of motion. Patient with significant tightness through right upper trap and levator scapulae. Continue progressing right shoulder strength, AROM, and postural re-education as tolerated to continue progressing functional mobility and promote return to prior level of function.      Patient will continue to benefit from skilled PT services to modify and progress therapeutic interventions, address functional mobility deficits, address ROM deficits, address strength deficits, analyze and address soft tissue restrictions, analyze and cue movement patterns, analyze and modify body mechanics/ergonomics, assess and modify postural abnormalities, address imbalance/dizziness and instruct in home and community integration to attain remaining goals. [x]  See Plan of Care  []  See progress note/recertification  []  See Discharge Summary         Progress towards goals / Updated goals:  Short Term Goals: To be accomplished in 2 weeks:  1. Patient will report performance of home exercise program 4 of 7 days in the next week demonstrating compliance to therapy program and progress towards independent management of symptoms.             Eval: HEP provided    Current: Met 12/28/2020 - patient reports compliance and independence to [address] be accomplished in 4 weeks:  1. Patient will report pain <2/10 with usual household activities showing improvements in functional mobility and return to prior level of function.              Eval: 8/10 after full day   2. Patient will report sleeping >/= 7 hours per night with waking up 0-1x/night demonstrating improved quality of life.               Eval: 4x/night  3. Patient will improve right shoulder strength grossly to 5/5 lifting/carrying groceries and perform household cleaning such as vacuuming and sweeping.               Eval: grossly 4/5  4. Patient will report no difficulty with reaching back into the seat behind her to promote return to prior level of function.               Eval: little difficulty   5. Patient will improve FOTO score to 80 to demonstrate return to prior level of function and to improve patients ability to perform household and recreational activities.                Eval: 68    PLAN  [x]  Upgrade activities as tolerated     []  Continue plan of care  []  Update interventions per flow sheet       []  Discharge due to:_  []  Other:_      Chidi Magdaleno, PT, DPT 12/28/2020  2:46 PM    Future Appointments   Date Time Provider Joel Bonilla   1/4/2021 10:00 AM Jerrica Muhammad MD [facility]

## 2021-01-04 ENCOUNTER — OFFICE VISIT (OUTPATIENT)
Dept: FAMILY MEDICINE CLINIC | Age: 31
End: 2021-01-04
Payer: OTHER GOVERNMENT

## 2021-01-04 VITALS
BODY MASS INDEX: 25.95 KG/M2 | OXYGEN SATURATION: 98 % | HEART RATE: 71 BPM | TEMPERATURE: 98 F | DIASTOLIC BLOOD PRESSURE: 68 MMHG | WEIGHT: 141 LBS | SYSTOLIC BLOOD PRESSURE: 116 MMHG | HEIGHT: 62 IN | RESPIRATION RATE: 16 BRPM

## 2021-01-04 DIAGNOSIS — Z30.431 IUD CHECK UP: ICD-10-CM

## 2021-01-04 DIAGNOSIS — N63.15 BREAST LUMP ON RIGHT SIDE AT 12 O'CLOCK POSITION: ICD-10-CM

## 2021-01-04 DIAGNOSIS — Z00.00 WELL WOMAN EXAM (NO GYNECOLOGICAL EXAM): ICD-10-CM

## 2021-01-04 DIAGNOSIS — M25.511 RIGHT SHOULDER PAIN, UNSPECIFIED CHRONICITY: ICD-10-CM

## 2021-01-04 DIAGNOSIS — Z80.3 FAMILY HISTORY OF BREAST CANCER: ICD-10-CM

## 2021-01-04 DIAGNOSIS — R79.89 LOW TSH LEVEL: Primary | ICD-10-CM

## 2021-01-04 DIAGNOSIS — R87.619 ABNORMAL CERVICAL PAPANICOLAOU SMEAR, UNSPECIFIED ABNORMAL PAP FINDING: ICD-10-CM

## 2021-01-04 DIAGNOSIS — M25.50 MULTIPLE JOINT PAIN: ICD-10-CM

## 2021-01-04 DIAGNOSIS — E55.9 VITAMIN D DEFICIENCY: ICD-10-CM

## 2021-01-04 PROCEDURE — 99395 PREV VISIT EST AGE 18-39: CPT | Performed by: FAMILY MEDICINE

## 2021-01-04 RX ORDER — UREA 10 %
100 LOTION (ML) TOPICAL DAILY
COMMUNITY
End: 2022-02-26

## 2021-01-04 RX ORDER — ACETAMINOPHEN 500 MG
TABLET ORAL 2 TIMES DAILY
COMMUNITY

## 2021-01-04 NOTE — PROGRESS NOTES
Chief Complaint   Patient presents with    Well Woman     w/pap- last 2019 with [facility] afterwards in [place] Results     1. Have you been to the ER, urgent care clinic since your last visit? Hospitalized since your last visit? No    2. Have you seen or consulted any other health care providers outside of the [address] [place] [address] since your last visit? Include any pap smears or colon screening.  Dr. Dina Redman- October

## 2021-01-04 NOTE — PATIENT INSTRUCTIONS
Well Visit, Ages 25 to 48: Care Instructions Your Care Instructions Physical exams can help you stay healthy. Your doctor has checked your overall health and may have suggested ways to take good care of yourself. He or she also may have recommended tests. At home, you can help prevent illness with healthy eating, regular exercise, and other steps. Follow-up care is a key part of your treatment and safety. Be sure to make and go to all appointments, and call your doctor if you are having problems. It's also a good idea to know your test results and keep a list of the medicines you take. How can you care for yourself at home? · Reach and stay at a healthy weight. This will lower your risk for many problems, such as obesity, diabetes, heart disease, and high blood pressure. · Get at least 30 minutes of physical activity on most days of the week. Walking is a good choice. You also may want to do other activities, such as running, swimming, cycling, or playing tennis or team sports. Discuss any changes in your exercise program with your doctor. · Do not smoke or allow others to smoke around you. If you need help quitting, talk to your doctor about stop-smoking programs and medicines. These can increase your chances of quitting for good. · Talk to your doctor about whether you have any risk factors for sexually transmitted infections (STIs). Having one sex partner (who does not have STIs and does not have sex with anyone else) is a good way to avoid these infections. · Use birth control if you do not want to have children at this time. Talk with your doctor about the choices available and what might be best for you. · Protect your skin from too much sun. When you're outdoors from 10 a.m. to 4 p.m., stay in the shade or cover up with clothing and a hat with a wide brim. Wear sunglasses that block UV rays. Even when it's cloudy, put broad-spectrum sunscreen (SPF 30 or higher) on any exposed skin. · See a dentist one or two times a year for checkups and to have your teeth cleaned. · Wear a seat belt in the car. Follow your doctor's advice about when to have certain tests. These tests can spot problems early. For everyone · Cholesterol. Have the fat (cholesterol) in your blood tested after age 21. Your doctor will tell you how often to have this done based on your age, family history, or other things that can increase your risk for heart disease. · Blood pressure. Have your blood pressure checked during a routine doctor visit. Your doctor will tell you how often to check your blood pressure based on your age, your blood pressure results, and other factors. · Vision. Talk with your doctor about how often to have a glaucoma test. 
· Diabetes. Ask your doctor whether you should have tests for diabetes. · Colon cancer. Your risk for colorectal cancer gets higher as you get older. Some experts say that adults should start regular screening at age 48 and stop at age 76. Others say to start before age 48 or continue after age 76. Talk with your doctor about your risk and when to start and stop screening. For women · Breast exam and mammogram. Talk to your doctor about when you should have a clinical breast exam and a mammogram. Medical experts differ on whether and how often women under 50 should have these tests. Your doctor can help you decide what is right for you. · Cervical cancer screening test and pelvic exam. Begin with a Pap test at age 24. The test often is part of a pelvic exam. Starting at age 27, you may choose to have a Pap test, an HPV test, or both tests at the same time (called co-testing). Talk with your doctor about how often to have testing. · Tests for sexually transmitted infections (STIs). Ask whether you should have tests for STIs. You may be at risk if you have sex with more than one person, especially if your partners do not wear condoms. For men · Tests for sexually transmitted infections (STIs). Ask whether you should have tests for STIs. You may be at risk if you have sex with more than one person, especially if you do not wear a condom. · Testicular cancer exam. Ask your doctor whether you should check your testicles regularly. · Prostate exam. Talk to your doctor about whether you should have a blood test (called a PSA test) for prostate cancer. Experts differ on whether and when men should have this test. Some experts suggest it if you are older than 39 and are -American or have a father or brother who got prostate cancer when he was younger than 72. When should you call for help? Watch closely for changes in your health, and be sure to contact your doctor if you have any problems or symptoms that concern you. Where can you learn more? Go to http://www.qiu.com/ Enter P072 in the search box to learn more about \"Well Visit, Ages 25 to 48: Care Instructions. \" Current as of: May 27, 2020               Content Version: 12.6 © 9361-5874 [employer]. Care instructions adapted under license by [employer] (which disclaims liability or warranty for this information). If you have questions about a medical condition or this instruction, always ask your healthcare professional. [place] [address] any warranty or liability for your use of this information. Breast Self-Exam: Care Instructions Your Care Instructions A breast self-exam is when you check your breasts for lumps or changes. This regular exam helps you learn how your breasts normally look and feel. Most breast problems or changes are not because of cancer. Breast self-exam is not a substitute for a mammogram. Having regular breast exams by your doctor and regular mammograms improve your chances of finding any problems with your breasts. Some women set a time each month to do a step-by-step breast self-exam. Other women like a less formal system. They might look at their breasts as they brush their teeth, or feel their breasts once in a while in the shower. If you notice a change in your breast, tell your doctor. Follow-up care is a key part of your treatment and safety. Be sure to make and go to all appointments, and call your doctor if you are having problems. It's also a good idea to know your test results and keep a list of the medicines you take. How do you do a breast self-exam? 
· The best time to examine your breasts is usually one week after your menstrual period begins. Your breasts should not be tender then. If you do not have periods, you might do your exam on a day of the month that is easy to remember. · To examine your breasts: ? Remove all your clothes above the waist and lie down. When you are lying down, your breast tissue spreads evenly over your chest wall, which makes it easier to feel all your breast tissue. ? Use the padsnot the fingertipsof the 3 middle fingers of your left hand to check your right breast. Move your fingers slowly in small coin-sized circles that overlap. ? Use three levels of pressure to feel of all your breast tissue. Use light pressure to feel the tissue close to the skin surface. Use medium pressure to feel a little deeper. Use firm pressure to feel your tissue close to your breastbone and ribs. Use each pressure level to feel your breast tissue before moving on to the next spot. ? Check your entire breast, moving up and down as if following a strip from the collarbone to the bra line, and from the armpit to the ribs. Repeat until you have covered the entire breast. 
? Repeat this procedure for your left breast, using the pads of the 3 middle fingers of your right hand. · To examine your breasts while in the shower: 
? Place one arm over your head and lightly soap your breast on that side. ? Using the pads of your fingers, gently move your hand over your breast (in the strip pattern described above), feeling carefully for any lumps or changes. ? Repeat for the other breast. 
· Have your doctor inspect anything you notice to see if you need further testing. Where can you learn more? Go to http://www.gray.com/ Enter P148 in the search box to learn more about \"Breast Self-Exam: Care Instructions. \" Current as of: April 29, 2020               Content Version: 12.6 © 3020-5704 [employer]. Care instructions adapted under license by [employer] (which disclaims liability or warranty for this information). If you have questions about a medical condition or this instruction, always ask your healthcare professional. [address] any warranty or liability for your use of this information. Breast Lumps: Care Instructions Your Care Instructions Breast lumps are common, especially in women between ages 27 and 48. Many women's breasts feel lumpy and tender before their menstrual period. Women also may have lumps when they are breastfeeding. Breast lumps may go away after menopause. All new breast lumps in women after menopause should be checked by a doctor. Although lumps may be normal for you, it is important to have your doctor check any lump or thickness that is not like the rest of your breast to make sure it is not cancer. A lump may be larger, harder, or different from the rest of your breast tissue. Follow-up care is a key part of your treatment and safety. Be sure to make and go to all appointments, and call your doctor if you are having problems. It's also a good idea to know your test results and keep a list of the medicines you take. How can you care for yourself at home? · Make an appointment to have a mammogram and other follow-up visits as recommended by your doctor. When should you call for help? Watch closely for changes in your health, and be sure to contact your doctor if: 
  · You do not get better as expected.  
  · Your breast has changed.  
  · You have pain in your breast.  
  · You have a discharge from your nipple.  
  · A breast lump changes or does not go away. Where can you learn more? Go to http://www.gray.com/ Enter Y779 in the search box to learn more about \"Breast Lumps: Care Instructions. \" Current as of: November 8, 2019               Content Version: 12.6 © 4235-0105 [employer]. Care instructions adapted under license by [employer] (which disclaims liability or warranty for this information). If you have questions about a medical condition or this instruction, always ask your healthcare professional. [address] any warranty or liability for your use of this information. Hashimoto's Thyroiditis: Care Instructions Your Care Instructions Hashimoto's thyroiditis is a problem with the thyroid gland. The thyroid gland, which is in your neck, controls the way your body uses energy. Sometimes the disease causes the gland to make too much thyroid hormone (thyrotoxicosis). This can make you feel nervous, lose weight, and have many loose bowel movements. You may also have a fast heartbeat. But as the disease progresses, the gland usually does not make enough thyroid hormone. This can cause you to feel tired and have dry skin and thinning hair. Most people with Hashimoto's are diagnosed when they have these symptoms. You may need to take medicine if you have symptoms or if your thyroid hormone level is not normal. Most people with Hashimoto's thyroiditis need to take medicine for the rest of their lives. Follow-up care is a key part of your treatment and safety. Be sure to make and go to all appointments, and call your doctor if you are having problems. It's also a good idea to know your test results and keep a list of the medicines you take. How can you care for yourself at home? · Take your medicines exactly as prescribed. Call your doctor if you think you are having a problem with your medicine. You will get more details on the specific medicines your doctor prescribes. When should you call for help? Call 911 anytime you think you may need emergency care. For example, call if: 
  · You passed out (lost consciousness).  
  · You have severe trouble breathing.  
  · You have a very slow heartbeat (less than 60 beats a minute).  
  · You have a low body temperature (95°F or below). Watch closely for changes in your health, and be sure to contact your doctor if: 
  · You gain weight even though you are eating normally or less than usual.  
  · You feel extremely weak or tired.  
  · You have new changes in your skin, nails, or hair, or the changes get worse.  
  · You notice that your thyroid gland has grown or changed in size.  
  · You have constipation that is new or that gets worse.  
  · You cannot stand cold temperatures.  
  · You have heavy or irregular menstrual periods.  
  · You have other new symptoms. Where can you learn more? Go to http://www.gray.com/ Enter K454 in the search box to learn more about \"Hashimoto's Thyroiditis: Care Instructions. \" Current as of: March 31, 2020               Content Version: 12.6 © 2664-3675 [employer]. Care instructions adapted under license by [employer] (which disclaims liability or warranty for this information). If you have questions about a medical condition or this instruction, always ask your healthcare professional. [address] any warranty or liability for your use of this information.

## 2021-01-04 NOTE — PROGRESS NOTES
Subjective:   31 y.o. female for Well Woman Check. Recently established as a new patient. Here for physical  Had an abnormal Pap smear and colposcopy done around early 2019 at [place]. No results available. Patient is due for Pap. She has IUD in willing to remove it and agreed to see the OB/GYN and not to do Pap and I recheck today. No pelvic pain or vaginal discharge. Sexually active. No prior history of STD. Also family history of breast cancer in mother in 35s. She had mammogram done almost 6 years ago for right breast lump. Was diagnosed as a fibroadenoma. She still has a presence of that lump. Today during clinical exam also noted rounded, smooth margin, movable almost 1/4 size lump at 12 o'clock position just above the nipple. No breast skin change. No nipple discharge. No tenderness. LMP January 1. No menstrual problems. Last visit she had adjustment in thyroid medication. Seen Dr. Celia Dash. Will be following the recommendation. She was given Cytomel which she has not started yet as she has to  from the pharmacy  Advised her to be compliant with the medication and follow specialist recommendation. She is under physical therapy for right shoulder pain. It has been gradually improving. X-ray shows no acute process. She had multiple side pain and joint pain sed rate was within normal limit. Could be related to Hashimoto's thyroiditis. Patient Active Problem List    Diagnosis Date Noted    Low TSH level 12/11/2020    Vitamin D deficiency 10/30/2019     Current Outpatient Medications   Medication Sig Dispense Refill    cholecalciferol (VITAMIN D3) (2,000 UNITS /50 MCG) cap capsule Take  by mouth two (2) times a day.  cyanocobalamin (VITAMIN B12) 100 mcg tablet Take 100 mcg by mouth daily.  levothyroxine (SYNTHROID) 75 mcg tablet Take 1 Tab by mouth Daily (before breakfast). 30 Tab 1    liothyronine (CYTOMEL) 5 mcg tablet Take 10 mcg by mouth daily.       melatonin 1 mg tablet Take  by mouth. Takes 1-2 x/week      INTRAUTERINE DEVICE, IUD, IU by IntraUTERine route. Indications: Paraguard placed approx 5 years ago       No Known Allergies  Past Medical History:   Diagnosis Date    Thyroid disease      No past surgical history on file. Family History   Problem Relation Age of Onset    Cancer Mother         breast & Ovarian    Hypertension Mother     Alcohol abuse Father     Elevated Lipids Father      Social History     Tobacco Use    Smoking status: Never Smoker    Smokeless tobacco: Never Used   Substance Use Topics    Alcohol use: Yes     Comment: occasionally        Lab Results   Component Value Date/Time    WBC 5.5 12/09/2020 11:53 AM    HGB 14.1 12/09/2020 11:53 AM    HCT 41.2 12/09/2020 11:53 AM    PLATELET 841 63/68/9473 11:53 AM    MCV 90.5 12/09/2020 11:53 AM     Lab Results   Component Value Date/Time    Sodium 141 12/09/2020 11:53 AM    Potassium 4.2 12/09/2020 11:53 AM    Chloride 107 12/09/2020 11:53 AM    CO2 28 12/09/2020 11:53 AM    Anion gap 6 12/09/2020 11:53 AM    Glucose 86 12/09/2020 11:53 AM    BUN 11 12/09/2020 11:53 AM    Creatinine 0.78 12/09/2020 11:53 AM    BUN/Creatinine ratio 14 12/09/2020 11:53 AM    GFR est AA >60 12/09/2020 11:53 AM    GFR est non-AA >60 12/09/2020 11:53 AM    Calcium 9.4 12/09/2020 11:53 AM    Bilirubin, total 0.5 12/09/2020 11:53 AM    Alk. phosphatase 63 12/09/2020 11:53 AM    Protein, total 7.0 12/09/2020 11:53 AM    Albumin 3.8 12/09/2020 11:53 AM    Globulin 3.2 12/09/2020 11:53 AM    A-G Ratio 1.2 12/09/2020 11:53 AM    ALT (SGPT) 17 12/09/2020 11:53 AM    AST (SGOT) 13 12/09/2020 11:53 AM       Lab Results   Component Value Date/Time    TSH 0.02 (L) 12/09/2020 11:53 AM       Lab Results   Component Value Date/Time    Vitamin D 25-Hydroxy 51.9 02/19/2020 12:30 PM           ROS: Feeling generally well. No TIA's or unusual headaches, no dysphagia. No prolonged cough. No dyspnea or chest pain on exertion.  No abdominal pain, change in bowel habits, black or bloody stools.  No urinary tract symptoms.  No new or unusual musculoskeletal symptoms.      Objective:   The patient appears well, alert, oriented x 3, in no distress.  Visit Vitals  /68 (BP 1 Location: Left arm, BP Patient Position: Sitting)   Pulse 71   Temp 98 °F (36.7 °C) (Oral)   Resp 16   Ht 5' 2\" (1.575 m)   Wt 141 lb (64 kg)   LMP 01/01/2021 (Approximate)   SpO2 98%   BMI 25.79 kg/m²     ENT normal.  Neck supple. No adenopathy or thyromegaly. ISMAEL. Lungs are clear, good air entry, no wheezes, rhonchi or rales. S1 and S2 normal, no murmurs, regular rate and rhythm. Abdomen soft without tenderness, guarding, mass or organomegaly. Extremities show no edema, normal peripheral pulses. Neurological is normal, no focal findings.  Breast exam: rt breast: Palpable quarter size smooth margin, soft consistency lump at 12 o'clock position just about the nipple area.  No breast tenderness.  No skin changes.  No axillary lymph node palpable.  Left breast.  Normal exam.  No palpable lump.  Pelvic exam: deferred to be done with ob/gyn     Assessment/Plan:       ICD-10-CM ICD-9-CM    1. Low TSH level: Seen Dr. Murrell.  Adjusted medication.  She was advised to be compliant with taking medications.  She can  the Cytomel.  Follow-up with specialist as the recommendation.  She wanted to change the endocrinologist.  Referral been done last visit.  Will observe R79.89 794.5     hashimoto's thyroiditis    2. Vitamin D deficiency: On supplement E55.9 268.9    3. Abnormal cervical Papanicolaou smear, unspecified abnormal pap finding: Had an abnormal Pap smear in late 2018.  Had colposcopy in 2019.  Not done any repeat Pap since then.  She wanted to get checked IUD or remove it.  Agreed to see the OB/GYN.  No Pap done today R87.619 795.00 REFERRAL TO OBSTETRICS AND GYNECOLOGY    had colposcopy needed in 2019 at [place]    4. Multiple joint pain: Sed rate  negative. Will observe and symptoms has been improved M25.50 719.49    5. Right shoulder pain, unspecified chronicity: Under therapy. Helping some. X-ray showed no acute process M25.511 719.41    6. IUD check up  Z30.431 V25.42 REFERRAL TO OBSTETRICS AND GYNECOLOGY   7. Family history of breast cancer  Z80.3 V16.3 [facility] MAMMO BI DX INCL CAD      CANCELED: [facility] MAMMO BI SCREENING INCL CAD    mother    6. Well woman exam (no gynecological exam)  Z00.00 V70.0     [V70.0]   9. Breast lump on right side at 12 o'clock position: Prior history of fibroadenoma. Currently as she has family history of breast cancer will get diagnostic mammogram and also order the ultrasound. Also spoke with the patient and is she has a family history of breast cancer and personal history of breast lumps she agrees to do the genetic counseling and further recommendations from the breast specialist.  Referral done during the visit N63.15 611.72 US BREAST RT LIMITED=<3 QUAD      [facility] MAMMO BI DX INCL CAD   Patient understood and agreed with above plan  Follow-up and Dispositions    · Return in about 3 months (around 4/4/2021).

## 2021-01-05 ENCOUNTER — HOSPITAL ENCOUNTER (OUTPATIENT)
Dept: PHYSICAL THERAPY | Age: 31
Discharge: HOME | End: 2021-01-05
Payer: OTHER GOVERNMENT

## 2021-01-05 PROCEDURE — 97110 THERAPEUTIC EXERCISES: CPT

## 2021-01-05 PROCEDURE — 97112 NEUROMUSCULAR REEDUCATION: CPT

## 2021-01-05 PROCEDURE — 97140 MANUAL THERAPY 1/> REGIONS: CPT

## 2021-01-05 NOTE — PROGRESS NOTES
PT DAILY TREATMENT NOTE     Patient Name: Emile Murillo  Date:2021  : 1990  [x]  Patient  Verified  Payor:  / Plan: [facility] / Product Type:  /    In time:2:15  Out time:3:10  Total Treatment Time (min): 55  Visit #: 4 of 8      Treatment Area: Pain in right shoulder [M25.511]    SUBJECTIVE  Pain Level (0-10 scale): 3  Any medication changes, allergies to medications, adverse drug reactions, diagnosis change, or new procedure performed?: [x] No    [] Yes (see summary sheet for update)  Subjective functional status/changes:   [] No changes reported  Pt reports no new changes since last visit. OBJECTIVE    Modality rationale: decrease pain and increase tissue extensibility to improve the patients ability to perform functional task with ease.    Min Type Additional Details    [] Estim:  []Unatt       []IFC  []Premod                        []Other:  []w/ice   []w/heat  Position:  Location:    [] Estim: []Att    []TENS instruct  []NMES                    []Other:  []w/US   []w/ice   []w/heat  Position:  Location:    []  Traction: [] Cervical       []Lumbar                       [] Prone          []Supine                       []Intermittent   []Continuous Lbs:  [] before manual  [] after manual    []  Ultrasound: []Continuous   [] Pulsed                           []1MHz   []3MHz W/cm2:  Location:    []  Iontophoresis with dexamethasone         Location: [] Take home patch   [] In clinic   10 []  Ice     [x]  heat  []  Ice massage  []  Laser   []  Anodyne Position: supine  Location: right shoulder    []  Laser with stim  []  Other:  Position:  Location:    []  Vasopneumatic Device Pressure:       [] lo [] med [] hi   Temperature: [] lo [] med [] hi   [] Skin assessment post-treatment:  []intact []redness- no adverse reaction    []redness  adverse reaction:       29 min Therapeutic Exercise:  [x] See flow sheet :   Rationale: increase ROM and increase strength to improve the patients ability to perform daily task with ease. 8 min Neuromuscular Re-education:  [x]  See flow sheet :   Rationale: increase strength, improve coordination and increase proprioception  to improve the patients ability to perform ADL's with ease. 8 min Manual Therapy:  Supine - SOR, gentle traction; S/L - STM/DTM to right upper trap/levator scapulae and tricep. Scapular clocks/circles in left S/L. The manual therapy interventions were performed at a separate and distinct time from the therapeutic activities interventions. Rationale: decrease pain, increase ROM, increase tissue extensibility and decrease trigger points to improve functional mobility with ADL's. With   [] TE   [] TA   [] neuro   [] other: Patient Education: [x] Review HEP    [] Progressed/Changed HEP based on:   [] positioning   [] body mechanics   [] transfers   [] heat/ice application    [] other:      Other Objective/Functional Measures:      Pain Level (0-10 scale) post treatment: 2    ASSESSMENT/Changes in Function:   Continued Trp's in the right UT and Levator noted during manual. IR/ER added with a org band with pt r.equiring cueing for form but reporting no increasing pain. Continued treatment to improve right shoulder strength and mobility to aid with ease of ADL's    Patient will continue to benefit from skilled PT services to modify and progress therapeutic interventions, address functional mobility deficits, address ROM deficits, address strength deficits, analyze and address soft tissue restrictions, analyze and cue movement patterns, analyze and modify body mechanics/ergonomics and assess and modify postural abnormalities to attain remaining goals. [x]  See Plan of Care  []  See progress note/recertification  []  See Discharge Summary         Progress towards goals / Updated goals:  Short Term Goals: To be accomplished in 2 weeks:  1.  Patient will report performance of home exercise program 4 of 7 days in the next week demonstrating compliance to therapy program and progress towards independent management of symptoms.             Eval: HEP provided               Current: Met 12/28/2020 - patient reports compliance and independence to [address] be accomplished in 4 weeks:  1. Patient will report pain <2/10 with usual household activities showing improvements in functional mobility and return to prior level of function.              Eval: 8/10 after full day   2. Patient will report sleeping >/= 7 hours per night with waking up 0-1x/night demonstrating improved quality of life.               Eval: 4x/night  3. Patient will improve right shoulder strength grossly to 5/5 lifting/carrying groceries and perform household cleaning such as vacuuming and sweeping.               Eval: grossly 4/5  4. Patient will report no difficulty with reaching back into the seat behind her to promote return to prior level of function.               Eval: little difficulty   5. Patient will improve FOTO score to 80 to demonstrate return to prior level of function and to improve patients ability to perform household and recreational activities.                Eval: 68    PLAN  []  Upgrade activities as tolerated     [x]  Continue plan of care  []  Update interventions per flow sheet       []  Discharge due to:_  []  Other:_      Amber Wolf, PTA 1/5/2021  2:25 PM    Future Appointments   Date Time Provider Joel Bonilla   1/12/2021  2:15 PM Stephanie Kovacs [facility]   1/19/2021  2:15 PM Christ Schneider PTA [facility]   1/26/2021  2:15 PM Christ Schneider PTA [facility]   2/2/2021  2:15 PM Chay  [facility]

## 2021-01-12 ENCOUNTER — HOSPITAL ENCOUNTER (OUTPATIENT)
Dept: PHYSICAL THERAPY | Age: 31
Discharge: HOME | End: 2021-01-12
Payer: OTHER GOVERNMENT

## 2021-01-12 PROCEDURE — 97140 MANUAL THERAPY 1/> REGIONS: CPT

## 2021-01-12 PROCEDURE — 97110 THERAPEUTIC EXERCISES: CPT

## 2021-01-12 PROCEDURE — 97112 NEUROMUSCULAR REEDUCATION: CPT

## 2021-01-12 NOTE — PROGRESS NOTES
PT DAILY TREATMENT NOTE 11    Patient Name: Kamron Tracy  Date:2021  : 1990  [x]  Patient  Verified  Payor: [employer] / Plan: [facility] / Product Type:  /    In time:215  Out time: 310  Total Treatment Time (min): 55  Visit #: 5 of 8     Treatment Area: Pain in right shoulder [M25.511]    SUBJECTIVE  Pain Level (0-10 scale): 3-4  Any medication changes, allergies to medications, adverse drug reactions, diagnosis change, or new procedure performed?: [x] No    [] Yes (see summary sheet for update)  Subjective functional status/changes:   [] No changes reported  Patient reports she feels she slept wrong causing some increased pain today.      OBJECTIVE    Modality rationale: decrease pain and increase tissue extensibility to improve the patients ability to perform functional tasks with greater ease    Min Type Additional Details    [] Estim:  []Unatt       []IFC  []Premod                        []Other:  []w/ice   []w/heat  Position:  Location:    [] Estim: []Att    []TENS instruct  []NMES                    []Other:  []w/US   []w/ice   []w/heat  Position:  Location:    []  Traction: [] Cervical       []Lumbar                       [] Prone          []Supine                       []Intermittent   []Continuous Lbs:  [] before manual  [] after manual    []  Ultrasound: []Continuous   [] Pulsed                           []1MHz   []3MHz W/cm2:  Location:    []  Iontophoresis with dexamethasone         Location: [] Take home patch   [] In clinic   10 []  Ice     [x]  heat  []  Ice massage  []  Laser   []  Anodyne Position: sitting   Location: cervical     []  Laser with stim  []  Other:  Position:  Location:    []  Vasopneumatic Device Pressure:       [] lo [] med [] hi   Temperature: [] lo [] med [] hi   [] Skin assessment post-treatment:  []intact []redness- no adverse reaction    []redness  adverse reaction:     25 min Therapeutic Exercise:  [x] See flow sheet: + re-assessment of goals    Rationale: increase ROM, increase strength and improve coordination to improve the patients ability to perform ADLs and functional tasks with greater ease     12 min Neuromuscular Re-education:  [x]  See flow sheet: SA press MIRIAM, scapular clocks, prone scapular stabilization exercises, quadruped stabilization exercises    Rationale: increase strength, improve coordination and increase proprioception  to improve the patients ability to perform functional mobility     8 min Manual Therapy:  Sitting - STM/DTM to bilateral UT, levator scap, rhomboids    The manual therapy interventions were performed at a separate and distinct time from the therapeutic activities interventions. Rationale: decrease pain, increase tissue extensibility and decrease trigger points to perform functional tasks with greater ease             With   [] TE   [] TA   [] neuro   [] other: Patient Education: [x] Review HEP    [] Progressed/Changed HEP based on:   [] positioning   [] body mechanics   [] transfers   [] heat/ice application    [] other:      Other Objective/Functional Measures: right shoulder flexion strength 5-/5, right shoulder abduction strength 5-/5, right shoulder internal rotation strength 4+/5, right shoulder external rotation strength 4+/5      Pain Level (0-10 scale) post treatment: 0    ASSESSMENT/Changes in Function: Patient presents for progress note today. She reports ~90% improvement overall in symptoms reporting greater ease with household activities and reaching behind her. She continues to have some right shoulder strength deficits compared to her left UE. She has met 2 long term goals and progressing towards the remaining goals. She would benefit from continued skilled PT 1x/week for 4 more weeks to continue addressing impairments and promote return to prior level of function. Education provided on towel roll technique for improved sleep mechanics and to limit sleep disturbances.      Patient will continue to benefit from skilled PT services to modify and progress therapeutic interventions, address functional mobility deficits, address ROM deficits, address strength deficits, analyze and address soft tissue restrictions, analyze and cue movement patterns, analyze and modify body mechanics/ergonomics, assess and modify postural abnormalities, address imbalance/dizziness and instruct in home and community integration to attain remaining goals. []  See Plan of Care  [x]  See progress note/recertification  []  See Discharge Summary         Progress towards goals / Updated goals:  Short Term Goals: To be accomplished in 2 weeks:  1. Patient will report performance of home exercise program 4 of 7 days in the next week demonstrating compliance to therapy program and progress towards independent management of symptoms.             Eval: HEP provided               [id number]: Met 12/28/2020 - patient reports compliance and independence to [address] be accomplished in 4 weeks:  1. Patient will report pain <2/10 with usual household activities showing improvements in functional mobility and return to prior level of function.              Eval: 8/10 after full day    Current: Progressing 1/12/2021 - 3/10 with full day of activity  2. Patient will report sleeping >/= 7 hours per night with waking up 0-1x/night demonstrating improved quality of life.               Eval: 4x/night   Current: Progressing 1/12/2021 - 2x/night   3. Patient will improve right shoulder strength grossly to 5/5 lifting/carrying groceries and perform household cleaning such as vacuuming and sweeping.               Eval: grossly 4/5   Current: Progressing 1/12/2021 - right shoulder flexion strength 5-/5, right shoulder abduction strength 5-/5, right shoulder internal rotation strength 4+/5, right shoulder external rotation strength 4+/5    4.  Patient will report no difficulty with reaching back into the seat behind her to promote return to prior level of function.               Eval: little difficulty    Current: Met 1/12/2021 - no difficulty   5. Patient will improve FOTO score to 80 to demonstrate return to prior level of function and to improve patients ability to perform household and recreational activities.                Eval: 76   Current: Met 1/12/2021 - 81     PLAN  [x]  Upgrade activities as tolerated     []  Continue plan of care  []  Update interventions per flow sheet       []  Discharge due to:_  []  Other:_      Amy Samuel, PT, DPT 1/12/2021  2:35 PM    Future Appointments   Date Time Provider Joel Bonilla   1/19/2021  2:15 PM Mervin Goldstein PTA [facility]   1/21/2021  2:15 PM [facility] RM 3 3D [facility]   1/21/2021  2:45 PM [facility]  RM 1 [facility]   1/26/2021  2:15 PM Mervin Goldstein PTA [facility]   2/2/2021  2:15 PM Ann Wood [facility]

## 2021-01-12 NOTE — PROGRESS NOTES
[facility]  [address]  [place], [address]  [phone] fax    Physical Therapy Progress Note  Patient name: [address] Start of Care: 12/15/2020   Referral source: Bienvenido Ricks MD : 1990   Medical/Treatment Diagnosis: Pain in right shoulder [M25.511]  Payor:  / Plan: [place] / Product Type:  /  Onset Date: ~2 months ago     Prior Hospitalization: see medical history Provider#: [id number]   Medications: Verified on Patient Summary List    Comorbidities: back pain   Prior Level of Function: independent with all mobility, chronic neck pain ~10 years but able to perform all tasks without compensations   Visits from Start of Care: 5    Missed Visits: 0      Established Goals:          Short Term Goals: To be accomplished in 2 weeks:  1. Patient will report performance of home exercise program 4 of 7 days in the next week demonstrating compliance to therapy program and progress towards independent management of symptoms.             Eval: HEP provided               NHLELANDF: Met 2020 - patient reports compliance and independence to [address] be accomplished in 4 weeks:  1. Patient will report pain <2/10 with usual household activities showing improvements in functional mobility and return to prior level of function.              Eval: 8/10 after full day    Current: Progressing - 3/10 with full day of activity  2. Patient will report sleeping >/= 7 hours per night with waking up 0-1x/night demonstrating improved quality of life.               Eval: 4x/night   Current: Progressing - 2x/night   3.  Patient will improve right shoulder strength grossly to 5/5 lifting/carrying groceries and perform household cleaning such as vacuuming and sweeping.               Eval: grossly 4/5   Current: Progressing - right shoulder flexion strength 5-/5, right shoulder abduction strength 5-/5, right shoulder internal rotation strength 4+/5, right shoulder external rotation strength 4+/5    4. Patient will report no difficulty with reaching back into the seat behind her to promote return to prior level of function.               Eval: little difficulty    Current: Met - no difficulty   5. Patient will improve FOTO score to 80 to demonstrate return to prior level of function and to improve patients ability to perform household and recreational activities.                Eval: 76   Current: Met  - 81     Key Functional Changes: increased right shoulder strength grossly, improve ability to perform household tasks, improved ability to reach behind     Updated Goals: to be achieved in 4 weeks:  1. Patient will report pain <2/10 with usual household activities showing improvements in functional mobility and return to prior level of function.              PN: 3/10 with full day of activity  2. Patient will report sleeping >/= 7 hours per night with waking up 0-1x/night demonstrating improved quality of life.              PN: 2x/night   3. Patient will improve right shoulder strength grossly to 5/5 lifting/carrying groceries and perform household cleaning such as vacuuming and sweeping.              PN: right shoulder flexion strength 5-/5, right shoulder abduction strength 5-/5, right shoulder internal rotation strength 4+/5, right shoulder external rotation strength 4+/5        ASSESSMENT/RECOMMENDATIONS: Patient presents for progress note today. She reports ~90% improvement overall in symptoms reporting greater ease with household activities and reaching behind her. She continues to have some right shoulder strength deficits compared to her left UE. She has met 2 long term goals and progressing towards the remaining goals. She would benefit from continued skilled PT 1x/week for 4 more weeks to continue addressing impairments and promote return to prior level of function.  Education provided on towel roll technique for improved sleep mechanics and to limit sleep disturbances. [x]Continue therapy per initial plan/protocol at a frequency of  1-2 x per week for 4 weeks  []Continue therapy with the following recommended changes:_____________________      _____________________________________________________________________  []Discontinue therapy progressing towards or have reached established goals  []Discontinue therapy due to lack of appreciable progress towards goals  []Discontinue therapy due to lack of attendance or compliance  []Await Physician's recommendations/decisions regarding therapy  []Other:________________________________________________________________    Thank you for this referral.    Nav Cordova, PT, DPT 1/12/2021 3:03 PM  NOTE TO PHYSICIAN:  [address]   FAX TO [facility]: [phone]  If you are unable to process this request in 24 hours please contact our office: [phone]    ? I have read the above report and request that my patient continue as recommended. ? I have read the above report and request that my patient continue therapy with the following changes/special instructions:__________________________________________________________  ? I have read the above report and request that my patient be discharged from therapy.     Physicians signature: ______________________________Date: ______Time:______

## 2021-01-19 ENCOUNTER — HOSPITAL ENCOUNTER (OUTPATIENT)
Dept: PHYSICAL THERAPY | Age: 31
Discharge: HOME | End: 2021-01-19
Payer: OTHER GOVERNMENT

## 2021-01-19 PROCEDURE — 97140 MANUAL THERAPY 1/> REGIONS: CPT

## 2021-01-19 PROCEDURE — 97110 THERAPEUTIC EXERCISES: CPT

## 2021-01-19 NOTE — PROGRESS NOTES
PT DAILY TREATMENT NOTE     Patient Name: Roger Marcial  Date:2021  : 1990  [x]  Patient  Verified  Payor:  / Plan: Martín Anderson 74 / Product Type:  /    In time:2:25  Out time:3:12  Total Treatment Time (min): 52  Visit #: 1 of 4-8    Treatment Area: Pain in right shoulder [M25.511]    SUBJECTIVE2  Pain Level (0-10 scale): 2 (sore)  Any medication changes, allergies to medications, adverse drug reactions, diagnosis change, or new procedure performed?: [x] No    [] Yes (see summary sheet for update)  Subjective functional status/changes:   [] No changes reported  Pt arrived 10 mins late. OBJECTIVE    Modality rationale: decrease pain and increase tissue extensibility to improve the patients ability to perform functional task with ease.    Min Type Additional Details    [] Estim:  []Unatt       []IFC  []Premod                        []Other:  []w/ice   []w/heat  Position:  Location:    [] Estim: []Att    []TENS instruct  []NMES                    []Other:  []w/US   []w/ice   []w/heat  Position:  Location:    []  Traction: [] Cervical       []Lumbar                       [] Prone          []Supine                       []Intermittent   []Continuous Lbs:  [] before manual  [] after manual    []  Ultrasound: []Continuous   [] Pulsed                           []1MHz   []3MHz W/cm2:  Location:    []  Iontophoresis with dexamethasone         Location: [] Take home patch   [] In clinic   10 []  Ice     [x]  heat  []  Ice massage  []  Laser   []  Anodyne Position: supine  Location: c/s    []  Laser with stim  []  Other:  Position:  Location:    []  Vasopneumatic Device Pressure:       [] lo [] med [] hi   Temperature: [] lo [] med [] hi   [] Skin assessment post-treatment:  []intact []redness- no adverse reaction    []redness  adverse reaction:         29 min Therapeutic Exercise:  [x] See flow sheet :   Rationale: increase ROM and increase strength to improve the patients ability to perform daily task with ease. 8 min Manual Therapy:  Sitting - STM/DTM to bilateral UT, levator scap, rhomboids    The manual therapy interventions were performed at a separate and distinct time from the therapeutic activities interventions. Rationale: decrease pain, increase ROM, increase tissue extensibility and decrease trigger points to improve functional mobility with overhead reaching ADL's. With   [] TE   [] TA   [] neuro   [] other: Patient Education: [x] Review HEP    [] Progressed/Changed HEP based on:   [] positioning   [] body mechanics   [] transfers   [] heat/ice application    [] other:      Other Objective/Functional Measures:      Pain Level (0-10 scale) post treatment: 0 (sore)    ASSESSMENT/Changes in Function:   Therex modified due to pt's late arrival. Pt continues to display increased tone in the right UT and LS noted during manual however reports significant pain relief since SOC. Increased reps to some exercises to continue to strengthen the right shoulder and aid with ease of overhead reaching. Patient will continue to benefit from skilled PT services to modify and progress therapeutic interventions, address functional mobility deficits, address ROM deficits, address strength deficits, analyze and address soft tissue restrictions, analyze and cue movement patterns, analyze and modify body mechanics/ergonomics and assess and modify postural abnormalities to attain remaining goals. [x]  See Plan of Care  []  See progress note/recertification  []  See Discharge Summary         Progress towards goals / Updated goals:  Updated Goals: to be achieved in 4 weeks:  1. Patient will report pain <2/10 with usual household activities showing improvements in functional mobility and return to prior level of function.              PN: 3/10 with full day of activity  2.  Patient will report sleeping >/= 7 hours per night with waking up 0-1x/night demonstrating improved quality of life.              EK: 2x/night   3.  Patient will improve right shoulder strength grossly to 5/5 lifting/carrying groceries and perform household cleaning such as vacuuming and sweeping.              PN: right shoulder flexion strength 5-/5, right shoulder abduction strength 5-/5, right shoulder internal rotation strength 4+/5, right shoulder external rotation strength 4+/5          PLAN  []  Upgrade activities as tolerated     [x]  Continue plan of care  []  Update interventions per flow sheet       []  Discharge due to:_  []  Other:_      Amber Wolf PTA 1/19/2021  2:27 PM    Future Appointments   Date Time Provider Joel Bonilla   1/26/2021  2:15 PM [facility]   2/2/2021  2:15 PM [facility]

## 2021-01-26 ENCOUNTER — HOSPITAL ENCOUNTER (OUTPATIENT)
Dept: PHYSICAL THERAPY | Age: 31
Discharge: HOME | End: 2021-01-26
Payer: OTHER GOVERNMENT

## 2021-01-26 PROCEDURE — 97140 MANUAL THERAPY 1/> REGIONS: CPT

## 2021-01-26 PROCEDURE — 97110 THERAPEUTIC EXERCISES: CPT

## 2021-01-26 NOTE — PROGRESS NOTES
PT DAILY TREATMENT NOTE     Patient Name: Roger Marcial  Date:2021  : 1990  [x]  Patient  Verified  Payor:  / Plan: Bev Jointer / Product Type:  /    In time:2:15  Out time: 3:01  Total Treatment Time (min): 46  Visit #: 2 of 4-8      Treatment Area: Pain in right shoulder [M25.511]    SUBJECTIVE  Pain Level (0-10 scale): 1  Any medication changes, allergies to medications, adverse drug reactions, diagnosis change, or new procedure performed?: [x] No    [] Yes (see summary sheet for update)  Subjective functional status/changes:   [x] No changes reported      OBJECTIVE    Modality rationale: decrease pain and increase tissue extensibility to improve the patients ability to perform daily task with ease.    Min Type Additional Details    [] Estim:  []Unatt       []IFC  []Premod                        []Other:  []w/ice   []w/heat  Position:  Location:    [] Estim: []Att    []TENS instruct  []NMES                    []Other:  []w/US   []w/ice   []w/heat  Position:  Location:    []  Traction: [] Cervical       []Lumbar                       [] Prone          []Supine                       []Intermittent   []Continuous Lbs:  [] before manual  [] after manual    []  Ultrasound: []Continuous   [] Pulsed                           []1MHz   []3MHz W/cm2:  Location:    []  Iontophoresis with dexamethasone         Location: [] Take home patch   [] In clinic   10 []  Ice     [x]  heat  []  Ice massage  []  Laser   []  Anodyne Position: seated  Location: right shoulder    []  Laser with stim  []  Other:  Position:  Location:    []  Vasopneumatic Device Pressure:       [] lo [] med [] hi   Temperature: [] lo [] med [] hi   [] Skin assessment post-treatment:  []intact []redness- no adverse reaction    []redness  adverse reaction:       28 min Therapeutic Exercise:  [x] See flow sheet :   Rationale: increase ROM and increase strength to improve the patients ability to perform functional task with ease. 8 min Manual Therapy:  Sitting - STM/DTM to bilateral UT, levator scap, rhomboids    The manual therapy interventions were performed at a separate and distinct time from the therapeutic activities interventions. Rationale: decrease pain, increase ROM, increase tissue extensibility and decrease trigger points to improve functional mobility with overhead reaching ADL's. With   [] TE   [] TA   [] neuro   [] other: Patient Education: [x] Review HEP    [] Progressed/Changed HEP based on:   [] positioning   [] body mechanics   [] transfers   [] heat/ice application    [] other:      Other Objective/Functional Measures:      Pain Level (0-10 scale) post treatment: 0    ASSESSMENT/Changes in Function:   Pt reports on average throughout her day her right shoulder pain is about a 1/10 reporting a significant decrease in pain since SOC. Pt reports she is able to sleep through the night without waking up due to pain and states she is no longer taking pain medicines for her right shoulder. Increased most reps to therex with no c/o pain in the right shoulder. Pt to finish out last appointment and prepare for d/c NV. Patient will continue to benefit from skilled PT services to modify and progress therapeutic interventions, address functional mobility deficits, address ROM deficits, address strength deficits, analyze and address soft tissue restrictions, analyze and cue movement patterns, analyze and modify body mechanics/ergonomics and assess and modify postural abnormalities to attain remaining goals. [x]  See Plan of Care  []  See progress note/recertification  []  See Discharge Summary         Progress towards goals / Updated goals:  Updated Goals: to be achieved in 4 weeks:  1.  Patient will report pain <2/10 with usual household activities showing improvements in functional mobility and return to prior level of function.              PN: 3/10 with full day of activity  Current: Met 1/26/21 1/10 on average  2. Patient will report sleeping >/= 7 hours per night with waking up 0-1x/night demonstrating improved quality of life.              PN: 2x/night   Current: Met 1/26/21Pt reports she is able to sleep through the night   3.  Patient will improve right shoulder strength grossly to 5/5 lifting/carrying groceries and perform household cleaning such as vacuuming and sweeping.              PN: right shoulder flexion strength 5-/5, right shoulder abduction strength 5-/5, right shoulder internal rotation strength 4+/5, right shoulder external rotation strength 4+/5         PLAN  []  Upgrade activities as tolerated     [x]  Continue plan of care  []  Update interventions per flow sheet       []  Discharge due to:_  []  Other:_      Dayana Galindo PTA 1/26/2021  2:12 PM    Future Appointments   Date Time Provider Joel Bonilla   1/26/2021  2:15 PM Marisa Alberts PTA [facility]   2/2/2021  2:15 PM Mahi Gilmore [facility]

## 2021-02-02 ENCOUNTER — HOSPITAL ENCOUNTER (OUTPATIENT)
Dept: PHYSICAL THERAPY | Age: 31
Discharge: HOME | End: 2021-02-02
Payer: OTHER GOVERNMENT

## 2021-02-02 PROCEDURE — 97110 THERAPEUTIC EXERCISES: CPT

## 2021-02-02 PROCEDURE — 97140 MANUAL THERAPY 1/> REGIONS: CPT

## 2021-02-02 NOTE — PROGRESS NOTES
PT DAILY TREATMENT NOTE 11    Patient Name: Dann Pinzon  Date:2021  : 1990  [x]  Patient  Verified  Payor: ÓSCAR / Plan: Martín Anderson 74 / Product Type:  /    In time: 215  Out time:302  Total Treatment Time (min): 52  Visit #: 3 of 4      Treatment Area: Pain in right shoulder [M25.511]    SUBJECTIVE  Pain Level (0-10 scale): 1  Any medication changes, allergies to medications, adverse drug reactions, diagnosis change, or new procedure performed?: [x] No    [] Yes (see summary sheet for update)  Subjective functional status/changes:   [] No changes reported  Patient reports she is doing a lot better since the start of therapy reporting less right shoulder pain and no longer requires Ibuprofen to manage pain.      OBJECTIVE    Modality rationale: decrease pain and increase tissue extensibility to improve the patients ability to perform reaching overhead with greater ease    Min Type Additional Details    [] Estim:  []Unatt       []IFC  []Premod                        []Other:  []w/ice   []w/heat  Position:  Location:    [] Estim: []Att    []TENS instruct  []NMES                    []Other:  []w/US   []w/ice   []w/heat  Position:  Location:    []  Traction: [] Cervical       []Lumbar                       [] Prone          []Supine                       []Intermittent   []Continuous Lbs:  [] before manual  [] after manual    []  Ultrasound: []Continuous   [] Pulsed                           []1MHz   []3MHz W/cm2:  Location:    []  Iontophoresis with dexamethasone         Location: [] Take home patch   [] In clinic   10 []  Ice     [x]  heat  []  Ice massage  []  Laser   []  Anodyne Position: supine  Location: cervical    []  Laser with stim  []  Other:  Position:  Location:    []  Vasopneumatic Device Pressure:       [] lo [] med [] hi   Temperature: [] lo [] med [] hi   [] Skin assessment post-treatment:  []intact []redness- no adverse reaction    []redness  adverse reaction:     29 min Therapeutic Exercise:  [x] See flow sheet: + re-assessment of goals and review of discharge/HEP    Rationale: increase ROM, increase strength and improve coordination to improve the patients ability to perform ADLs and self care tasks with greater ease     8 min Manual Therapy:  Sitting - STM/DTM to right upper trap, levator scap, and rhomboids   The manual therapy interventions were performed at a separate and distinct time from the therapeutic activities interventions. Rationale: decrease pain, increase tissue extensibility and decrease trigger points to perform functional mobility with greater ease           With   [] TE   [] TA   [] neuro   [] other: Patient Education: [x] Review HEP    [] Progressed/Changed HEP based on:   [] positioning   [] body mechanics   [] transfers   [] heat/ice application    [] other:      Other Objective/Functional Measures: Right shoulder flexion 5/5, right shoulder scaption 5/5, right shoulder internal rotation 5-/5, right shoulder external rotation 5-/5      Pain Level (0-10 scale) post treatment: 0     ASSESSMENT/Changes in Function: Patient presents for last visit scheduled under current plan of care. She reports ~95% improvements in overall symptoms in right shoulder and has not required use of NSAIDs to manage pain like she did in the beginning. She demonstrates improved functional mobility as measured by FOTO survey, improved right shoulder strength grossly, improved ability to sleep with less pain, and improved overall pain with usual household duties. She was encouraged to continue HEP to maintain gains made during therapy and use heat as needed to manage stiffness/soreness.      Patient will continue to benefit from skilled PT services to modify and progress therapeutic interventions, address functional mobility deficits, address ROM deficits, address strength deficits, analyze and address soft tissue restrictions, analyze and cue movement patterns, analyze and modify body mechanics/ergonomics, assess and modify postural abnormalities, address imbalance/dizziness and instruct in home and community integration to attain remaining goals. [x]  See Plan of Care  []  See progress note/recertification  []  See Discharge Summary         Progress towards goals / Updated goals:  Updated Goals: to be achieved in 4 weeks:  1. Patient will report pain <2/10 with usual household activities showing improvements in functional mobility and return to prior level of function.              PN: 3/10 with full day of activity  Current: Met 1/26/21 1/10 on average  2. Patient will report sleeping >/= 7 hours per night with waking up 0-1x/night demonstrating improved quality of life.              PN: 2x/night   Current: Met 1/26/21 - Pt reports she is able to sleep through the night   3. Patient will improve right shoulder strength grossly to 5/5 lifting/carrying groceries and perform household cleaning such as vacuuming and sweeping.              PN: right shoulder flexion strength 5-/5, right shoulder abduction strength 5-/5, right shoulder internal rotation strength 4+/5, right shoulder external rotation strength 4+/5     Current: Met 2/2/2021 - Right shoulder flexion 5/5, right shoulder scaption 5/5, right shoulder internal rotation 5-/5, right shoulder external rotation 5-/5      PLAN  []  Upgrade activities as tolerated     []  Continue plan of care  []  Update interventions per flow sheet       [x]  Discharge due to: patient reaching all long term goals   []  Other:_      Holly Duque, PT, DPT 2/2/2021  2:19 PM    No future appointments.

## 2021-02-02 NOTE — PROGRESS NOTES
Physical Therapy Discharge Instructions      [facility] [address] [address] [address]  [place], [address]  [phone] fax    Patient:  Sonal Clayton  : 1990      Continue Home Exercise Program 1 times per day for 4 weeks, then decrease to 4 times per week      Continue with    [] Ice  as needed 2-3 times per day     [x] Heat           Follow up with MD:     [] Upon completion of therapy     [x] As needed      Recommendations:     [x]   Return to activity with home program    []   Return to activity with the following modifications:       []Post Rehab Program    []Join Independent aquatic program     []Return to/join local gym          Clotilde Gonzalez PT, DPT 2021 2:32 PM  Lisette Palmer, PTA

## 2021-02-02 NOTE — PROGRESS NOTES
[facility]  [address]  [place], [address]  [phone] fax    Physical Therapy Discharge Summary  Patient name: [address] Start of Care: 12/15/2020   Referral source: Lefty Corona MD : 1990   Medical/Treatment Diagnosis: Pain in right shoulder [M25.511]  Payor: ÓSCAR / Plan: Martín Anderson 74 / Product Type: Vidal Half /  Onset Date:~3 months ago     Prior Hospitalization: see medical history Provider#: [id number]   Medications: Verified on Patient Summary List    Comorbidities: back pain   Prior Level of Function: independent with all mobility, chronic neck pain ~10 years but able to perform all tasks without compensations   Visits from Start of Care: 8    Missed Visits: 0  Reporting Period : 2021 to 2021      Summary of Care:  Updated Goals: to be achieved in 4 weeks:  1. Patient will report pain <2/10 with usual household activities showing improvements in functional mobility and return to prior level of function.              PN: 3/10 with full day of activity  Current: Met - 1/10 on average  2. Patient will report sleeping >/= 7 hours per night with waking up 0-1x/night demonstrating improved quality of life.              PN: 2x/night   Current: Met - Pt reports she is able to sleep through the night   3. Patient will improve right shoulder strength grossly to 5/5 lifting/carrying groceries and perform household cleaning such as vacuuming and sweeping.              PN: right shoulder flexion strength 5-/5, right shoulder abduction strength 5-/5, right shoulder internal rotation strength 4+/5, right shoulder external rotation strength 4+/5     Current: Met - Right shoulder flexion 5/5, right shoulder scaption 5/5, right shoulder internal rotation 5-/5, right shoulder external rotation 5-/5        ASSESSMENT/RECOMMENDATIONS: Patient presents for last visit scheduled under current plan of care.  She reports ~95% improvements in overall symptoms in right shoulder and has not required use of NSAIDs to manage pain like she did in the beginning. She demonstrates improved functional mobility as measured by FOTO survey, improved right shoulder strength grossly, improved ability to sleep with less pain, and improved overall pain with usual household duties. Patient independent with all therex not requiring VC to correct form at this time. She was encouraged to continue HEP to maintain gains made during therapy and use heat as needed to manage stiffness/soreness.  Thank you for this referral.     [x]Discontinue therapy: [x]Patient has reached or is progressing toward set goals      []Patient is non-compliant or has abdicated      []Due to lack of appreciable progress towards set goals    Laura Burdick, PT, DPT 2/2/2021 2:29 PM

## 2021-02-06 DIAGNOSIS — R79.89 LOW TSH LEVEL: ICD-10-CM

## 2021-02-08 RX ORDER — LEVOTHYROXINE SODIUM 75 UG/1
75 TABLET ORAL
Qty: 30 TAB | Refills: 1 | Status: SHIPPED | OUTPATIENT
Start: 2021-02-08 | End: 2021-04-26 | Stop reason: SDUPTHER

## 2021-03-16 ENCOUNTER — TELEPHONE (OUTPATIENT)
Dept: FAMILY MEDICINE CLINIC | Age: 31
End: 2021-03-16

## 2021-03-16 DIAGNOSIS — Z30.432 ENCOUNTER FOR IUD REMOVAL: Primary | ICD-10-CM

## 2021-03-17 NOTE — TELEPHONE ENCOUNTER
A referral request has been sent to [facility] requesting Dx code C48.229 (Encounter for IUD removal) be added to current OB/GYN authorization; pending approval. Patient was referred by Fronie Goldmann to [facility] and authorization began on 2020 and will  2021; valid four visits. A message has been sent to patient via [employer].

## 2021-06-25 ENCOUNTER — TELEPHONE (OUTPATIENT)
Dept: FAMILY MEDICINE CLINIC | Age: 31
End: 2021-06-25

## 2021-07-06 DIAGNOSIS — Z32.01 POSITIVE PREGNANCY TEST: Primary | ICD-10-CM

## 2021-07-24 LAB — SARS-COV-2, NAA: POSITIVE

## 2021-07-26 ENCOUNTER — TELEPHONE (OUTPATIENT)
Dept: FAMILY MEDICINE CLINIC | Age: 31
End: 2021-07-26

## 2021-07-26 NOTE — TELEPHONE ENCOUNTER
Pt called stating she tested positive at [facility] on 7/24/2021. Pt states she is 8 weeks pregnant and wants to know if their is anything she can take other than Tylenol. Pt states she has been taking two 500 mg Tylenol twice a day and wants to know can she take Emergen-C. Pt states she has a fever, chills, body aches, headaches, nauseous, and no appetite. Pt states she has been drinking plenty of Pedialyte. Please advise.

## 2021-07-26 NOTE — TELEPHONE ENCOUNTER
Called pt by myself. Tested positive for COVID -19 infection. She has fever. She is 8 wks pregnant. I have advised her to call ob/gyn office and advised to call them and see if they can see you as telephone consultation or tele visit. Can take tylenol with food. Stay well hydrated. Any sob, leg swelling or any chest pain go to ER. High risk of blood clot so stay active. She is going to call ob office to see if provider can see her as tele visit. She understood and agree with the plan.      Carlos Dumas

## 2021-09-17 ENCOUNTER — TELEPHONE (OUTPATIENT)
Dept: FAMILY MEDICINE CLINIC | Age: 31
End: 2021-09-17

## 2021-09-17 NOTE — TELEPHONE ENCOUNTER
Pt called stating she was contacted by [facility] to schedule a follow up ultrasound of the right breast. Pt states she needs a new referral for this faxed over to [facility]. Please advise.

## 2021-09-20 NOTE — TELEPHONE ENCOUNTER
Order is old. Almost 9 months. Did she have any study done during these 9 months. Please provide her sooner appt and if still presence of lump we can order diagnostic study and mammogram otherwise just mammogram as family history of breast cancer.

## 2021-10-07 ENCOUNTER — TELEPHONE (OUTPATIENT)
Dept: FAMILY MEDICINE CLINIC | Age: 31
End: 2021-10-07

## 2021-10-07 NOTE — TELEPHONE ENCOUNTER
Pt called to have a new breast ultrasound of right [facility] Please call pt at your earliest convenience.

## 2021-10-08 NOTE — TELEPHONE ENCOUNTER
Pt had palpable lump during exam back in January. Pt has h/o fibroadenoma so diagnostic mammogram and ultrasound was ordered. Please fax both orders from January. Pt is also over due for follow up . Please offer follow up appt to discuss result as well. If needed will consider her to see breast specialist at that time.      Ambrocio Marie

## 2021-10-08 NOTE — TELEPHONE ENCOUNTER
See below message and please confirm, pt is pregnant? ?  I see referral to ob/gyn back in July for pregnancy? ?  Please ask what is the concern right now. Because if she is pregnant, might change the plan. In that case can schedule a visit and I can discuss it further during visit.    Can be in person and if in person has long wait, can do virtual.     Thaddeus Baxter

## 2021-10-11 ENCOUNTER — VIRTUAL VISIT (OUTPATIENT)
Dept: FAMILY MEDICINE CLINIC | Age: 31
End: 2021-10-11
Payer: OTHER GOVERNMENT

## 2021-10-11 DIAGNOSIS — E03.9 HYPOTHYROIDISM, UNSPECIFIED TYPE: ICD-10-CM

## 2021-10-11 DIAGNOSIS — R92.8 ABNORMAL MAMMOGRAM: Primary | ICD-10-CM

## 2021-10-11 PROCEDURE — 99213 OFFICE O/P EST LOW 20 MIN: CPT | Performed by: FAMILY MEDICINE

## 2021-10-11 NOTE — TELEPHONE ENCOUNTER
Spoke with patient earlier today (two identifiers verified) and she stated she is 18 weeks pregnant; LMP: 5/29/21. Per patient, breast ultrasound order request is for her 6 month follow-up. Patient had virtual visit with Dr. Callie Persaud on 10/11/21 to discuss orders. Closing encounter.

## 2021-10-11 NOTE — PROGRESS NOTES
Kevin Hooper is a 27 y.o. female who was seen by synchronous (real-time) audio-video technology on 10/11/2021 for Other (abnromal mammogram)        Assessment & Plan:   Diagnoses and all orders for this visit:    Abnormal mammogram: Has a history of fibroadenoma. Had an abnormal mammogram and it was repeated in January. Had to go for left breast biopsy. According to patient it was all done at [facility] and biopsy result was benign. She does get bilateral palpable lump on and off. Currently asking for right-sided breast ultrasound to be done as recommended back in February 2021 after left breast biopsy. At this time patient is 19 weeks pregnant. I have advised her to follow-up with the breast specialist at [facility] to ask for further recommendation. Currently following OB/GYN at [facility]. I have asked her to talk to the OB/GYN to do the breast exam regarding palpable lumps currently. Patient understood and agreed with the plan. She will talk to the [facility] and OB/GYN and call back to me if she needed any further help. Hypothyroidism, unspecified type: Following Dr. Ronny Carrillo. Adjusted medication by them. Will follow the recommendation and OB/GYN as she is 19 weeks pregnant. Patient understood agree with the plan  Follow-up in 4 months when she is done with the delivery    Please note that this dictation was completed with [employer], the computer voice recognition software. Quite often unanticipated grammatical, syntax, homophones, and other interpretive errors are inadvertently transcribed by the computer software. Please disregard these errors. Please excuse any errors that have escaped final proofreading. 712  Subjective:     Done visit through [facility] ask for a right breast ultrasound. She does have on and off history of fibroadenoma on both breast.  She had an abnormal mammogram back in January of this year.   She had not done it at the [id number] [place] [address].  Also had a left breast lump biopsy which was benign per patient. At that time right breast lump was present but was not biopsied and I recommended observation and repeat ultrasound in 6 months. She is now pregnant. I have recommended to hold off on repeating ultrasound and take the advice of breast specialist regarding repeat ultrasound at this time. She does have a bilateral breast palpable lump on and off which is benign in nature in the past.  At this time she is following OB/GYN at [facility]. She is 19 weeks pregnant. I have advised her to talk to the OB/GYN to do the breast exam to have a further recommendation as well. She will do that. No breast pain or any change in skin over the breast area. No fever. Hypothyroidism. Following Dr. Ana Tabares. Currently following the recommendation regarding medication adjustment. Being compliant with taking the medications. Currently not taking any other vitamin supplement except prenatal vitamins. Prior to Admission medications    Medication Sig Start Date End Date Taking? Authorizing Provider   PNV with Ca No.65-Iron Poly-FA 60 mg iron-1 mg cap Take  by mouth. Yes Provider, Historical   levothyroxine (SYNTHROID) 75 mcg tablet Take 1 Tab by mouth Daily (before breakfast). 4/26/21  Yes Jaret Adams MD   cholecalciferol (VITAMIN D3) (2,000 UNITS /50 MCG) cap capsule Take  by mouth two (2) times a day. Yes Provider, Historical   cyanocobalamin (VITAMIN B12) 100 mcg tablet Take 100 mcg by mouth daily. Patient not taking: Reported on 10/11/2021    Provider, Historical   liothyronine (CYTOMEL) 5 mcg tablet Take 10 mcg by mouth daily. Patient not taking: Reported on 10/11/2021    Provider, Historical   melatonin 1 mg tablet Take  by mouth. Takes 1-2 x/week  Patient not taking: Reported on 10/11/2021    Provider, Historical   INTRAUTERINE DEVICE, IUD, IU by IntraUTERine route.  Indications: Paraguard placed approx 5 years ago  Patient not taking: Reported on 10/11/2021    Provider, Historical     Patient Active Problem List   Diagnosis Code    Vitamin D deficiency E55.9    Low TSH level R79.89    Hypothyroidism E03.9    Abnormal mammogram R92.8     Patient Active Problem List    Diagnosis Date Noted    Hypothyroidism 10/11/2021    Abnormal mammogram 10/11/2021    Low TSH level 12/11/2020    Vitamin D deficiency 10/30/2019     Current Outpatient Medications   Medication Sig Dispense Refill    PNV with Ca No.65-Iron Poly-FA 60 mg iron-1 mg cap Take  by mouth.  levothyroxine (SYNTHROID) 75 mcg tablet Take 1 Tab by mouth Daily (before breakfast). 90 Tab 0    cholecalciferol (VITAMIN D3) (2,000 UNITS /50 MCG) cap capsule Take  by mouth two (2) times a day.  cyanocobalamin (VITAMIN B12) 100 mcg tablet Take 100 mcg by mouth daily. (Patient not taking: Reported on 10/11/2021)      liothyronine (CYTOMEL) 5 mcg tablet Take 10 mcg by mouth daily. (Patient not taking: Reported on 10/11/2021)      melatonin 1 mg tablet Take  by mouth. Takes 1-2 x/week (Patient not taking: Reported on 10/11/2021)      INTRAUTERINE DEVICE, IUD, IU by IntraUTERine route.  Indications: Paraguard placed approx 5 years ago (Patient not taking: Reported on 10/11/2021)       No Known Allergies  Past Medical History:   Diagnosis Date    Thyroid disease      Social History     Tobacco Use    Smoking status: Never Smoker    Smokeless tobacco: Never Used   Substance Use Topics    Alcohol use: Yes     Comment: occasionally       ROS: See HPI    Objective:     Patient-Reported Vitals 12/1/2020   Patient-Reported Weight 145   Patient-Reported Height 52      General: alert, cooperative, no distress   Mental  status: normal mood, behavior, speech, dress, motor activity, and thought processes, able to follow commands   HENT: NCAT   Neck: no visualized mass   Resp: no respiratory distress   Neuro: no gross deficits   Skin: no discoloration or lesions of concern on visible areas   Psychiatric: normal affect, consistent with stated mood, no evidence of hallucinations     Additional exam findings: We discussed the expected course, resolution and complications of the diagnosis(es) in detail. Medication risks, benefits, costs, interactions, and alternatives were discussed as indicated. I advised her to contact the office if her condition worsens, changes or fails to improve as anticipated. She expressed understanding with the diagnosis(es) and plan. Replaced by [facility], was evaluated through a synchronous (real-time) audio-video encounter. The patient (or guardian if applicable) is aware that this is a billable service. Verbal consent to proceed has been obtained within the past 12 months. The visit was conducted pursuant to the emergency declaration under the [address] [place] [address] authority and the [employer] Act. Patient identification was verified, and a caregiver was present when appropriate. The patient was located in a state where the provider was credentialed to provide care.     Tiara Lew MD

## 2021-11-16 ENCOUNTER — TELEPHONE (OUTPATIENT)
Dept: FAMILY MEDICINE CLINIC | Age: 31
End: 2021-11-16

## 2021-11-16 NOTE — TELEPHONE ENCOUNTER
Lynda Sandra from [facility] returned call to [facility] regarding referral to [facility]. I advised I needed the ICD-10 code diagnosis for [facility] referral. Lynda Sandra advised she checked with the provider and was told there wasn't an exact diagnosis code for \"evaluation of bilateral urinary tract dilation\". 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      A referral request has been submitted to [facility]; pending approval.

## 2022-02-24 PROBLEM — Z37.9 NORMAL LABOR: Status: ACTIVE | Noted: 2022-02-24

## 2022-02-24 PROBLEM — Z34.90 TERM PREGNANCY: Status: ACTIVE | Noted: 2022-02-24

## 2022-03-18 PROBLEM — E03.9 HYPOTHYROIDISM: Status: ACTIVE | Noted: 2021-10-11

## 2022-03-19 PROBLEM — R79.89 LOW TSH LEVEL: Status: ACTIVE | Noted: 2020-12-11

## 2022-03-19 PROBLEM — E55.9 VITAMIN D DEFICIENCY: Status: ACTIVE | Noted: 2019-10-30

## 2022-03-19 PROBLEM — Z34.90 TERM PREGNANCY: Status: ACTIVE | Noted: 2022-02-24

## 2022-03-20 PROBLEM — R92.8 ABNORMAL MAMMOGRAM: Status: ACTIVE | Noted: 2021-10-11

## 2022-03-20 PROBLEM — Z37.9 NORMAL LABOR: Status: ACTIVE | Noted: 2022-02-24

## 2023-02-07 ENCOUNTER — OFFICE VISIT (OUTPATIENT)
Dept: FAMILY MEDICINE CLINIC | Age: 33
End: 2023-02-07
Payer: OTHER GOVERNMENT

## 2023-02-07 VITALS
RESPIRATION RATE: 16 BRPM | SYSTOLIC BLOOD PRESSURE: 106 MMHG | BODY MASS INDEX: 27.64 KG/M2 | HEART RATE: 96 BPM | DIASTOLIC BLOOD PRESSURE: 64 MMHG | TEMPERATURE: 97.7 F | OXYGEN SATURATION: 98 % | WEIGHT: 150.2 LBS | HEIGHT: 62 IN

## 2023-02-07 DIAGNOSIS — M54.2 NECK PAIN: Primary | ICD-10-CM

## 2023-02-07 DIAGNOSIS — M54.9 DISCOMFORT OF BACK: ICD-10-CM

## 2023-02-07 PROCEDURE — 99213 OFFICE O/P EST LOW 20 MIN: CPT | Performed by: FAMILY MEDICINE

## 2023-02-07 RX ORDER — PRENATAL VIT W/ FE FUMARATE-FA TAB 27-0.8 MG 27-0.8 MG
TAB ORAL
COMMUNITY
Start: 2023-01-26

## 2023-02-07 NOTE — PROGRESS NOTES
1. \"Have you been to the ER, urgent care clinic since your last visit? Hospitalized since your last visit? \" Yes When: [facility] 2/24/22 normal labor Dr. Nikki Miller    2. \"Have you seen or consulted any other health care providers outside of the [address] [place] [address] since your last visit? \" No     3. For patients aged 39-70: Has the patient had a colonoscopy / FIT/ Cologuard? NA - based on age      If the patient is female:    4. For patients aged 41-77: Has the patient had a mammogram within the past 2 years? Yes Mammogram over 1 year ago. Saw [facility] in 2021. Will obtain records from [facility].      5. For patients aged 21-65: Has the patient had a pap smear? Yes - no Care Gap present - 1 year ago with Dr. Lani Mason at [facility]. Will call for record.     Chief Complaint   Patient presents with    Other     Severe pain in back, neck, and shoulders which is causing headache x 3 months and worse in the past weeks     Headache

## 2023-02-07 NOTE — PROGRESS NOTES
HISTORY OF PRESENT ILLNESS  Soo Ernst is a 34 y.o. female. HPI: Here with a complaint of feeling lower back pain and neck pain, bilateral trapezius muscle discomfort going on since few months. Lately getting worse. Pain is mild to moderate in intensity. Getting headache over the back of the head on and off. No tingling or numbness in extremity. She had an epidural last year during childbirth. No tingling numbness or loss of urine or bowel control. Able to ambulate without support. Currently breast-feeding. Taking Tylenol and Motrin but was worried that how long she can take it. Sitting without any acute distress  Denies any headache, dizziness, no chest pain or trouble breathing, no arm or leg weakness. No nausea or vomiting, no weight or appetite changes, no mood changes . No urine or bowel complains, no palpitation, no diaphoresis. No abdominal pain. No cold or cough. No leg swelling. No fever. No sleep trouble. Visit Vitals  /64 (BP 1 Location: Left upper arm, BP Patient Position: Sitting, BP Cuff Size: Adult)   Pulse 96   Temp 97.7 °F (36.5 °C) (Temporal)   Resp 16   Ht 5' 2\" (1.575 m)   Wt 150 lb 3.2 oz (68.1 kg)   SpO2 98%   Breastfeeding Yes   BMI 27.47 kg/m²     Review medication list, vitals, problem list,allergies. ROS: See HPI    Physical Exam  Musculoskeletal:      Comments: No point tenderness over cervical spine. Generalized discomfort over bilateral trapezius muscle area. Range of motion of the bilateral shoulders within normal limit. Range of motion of her cervical spine within normal limit chest discomfort with the touching chin to the chest  Lumbar spine: Generalized discomfort over L4-5, L5-S1. Mild paraspinal muscle discomfort both side. Range of motion discomfort with movement   Neurological:      General: No focal deficit present. Mental Status: She is alert and oriented to person, place, and time. ASSESSMENT and PLAN    ICD-10-CM ICD-9-CM    1. Neck pain: Probably musculoskeletal pain. Obtaining x-ray. Sending for physical therapy. Heating pad. Tylenol and Advil alternate with the food. Discussed breast-feeding safety with OTC medication. M54.2 723.1 XR SPINE CERV PA LAT ODONT 3 V MAX      REFERRAL TO PHYSICAL THERAPY      2. Discomfort of back: Heating pad. Tylenol and Advil alternate with food. Sending to physical therapy M54.9 724.5 XR SPINE LUMB 2 OR 3 V      REFERRAL TO PHYSICAL THERAPY      Patient understood agreed with the plan  Due immunization from the pharmacy if decides to  Follow-up and Dispositions    Return in about 2 months (around 4/7/2023). Please note that this dictation was completed with [employer], the computer voice recognition software. Quite often unanticipated grammatical, syntax, homophones, and other interpretive errors are inadvertently transcribed by the computer software. Please disregard these errors. Please excuse any errors that have escaped final proofreading.

## 2023-02-24 ENCOUNTER — TELEPHONE (OUTPATIENT)
Age: 33
End: 2023-02-24

## 2023-02-24 NOTE — TELEPHONE ENCOUNTER
[facility] approval has been received for PT at [facility] [id number] [place] [address]; Auth: [id number] beginning 2/18/23 and expires 6/18/23; valid 16 visits. Dx: M54.2 & M54.9.  approval, referral order, patient demographics and insurance card have been faxed to [facility]. A fax confirmation has been received.

## 2023-04-17 ENCOUNTER — HOSPITAL ENCOUNTER (OUTPATIENT)
Facility: HOSPITAL | Age: 33
Discharge: HOME | End: 2023-04-20
Payer: OTHER GOVERNMENT

## 2023-04-17 DIAGNOSIS — M54.2 NECK PAIN: ICD-10-CM

## 2023-04-17 DIAGNOSIS — M54.50 LOW BACK PAIN, UNSPECIFIED BACK PAIN LATERALITY, UNSPECIFIED CHRONICITY, UNSPECIFIED WHETHER SCIATICA PRESENT: ICD-10-CM

## 2023-04-17 PROCEDURE — 72040 X-RAY EXAM NECK SPINE 2-3 VW: CPT

## 2023-04-17 PROCEDURE — 72100 X-RAY EXAM L-S SPINE 2/3 VWS: CPT

## 2023-06-08 ENCOUNTER — PATIENT MESSAGE (OUTPATIENT)
Age: 33
End: 2023-06-08

## 2023-06-08 DIAGNOSIS — M54.2 NECK PAIN: Primary | ICD-10-CM

## 2023-06-08 DIAGNOSIS — M54.9 DISCOMFORT OF BACK: ICD-10-CM

## 2023-06-09 NOTE — TELEPHONE ENCOUNTER
2/7/23 PT referral reviewed. M54.2 (ICD-10-CM) - Neck pain  M54.9 (ICD-10-CM) - Discomfort of back      New referral placed.

## 2023-06-09 NOTE — TELEPHONE ENCOUNTER
From: Gari Denver  To: Dr. Debby Harris: 2023 2:40 PM EDT  Subject: Physical therapy referral     Good Morning,  I wanted to request for a new referral to be put in for my back issue for PT. Current one is about to  on the  and I cant start going until after the  when my kids are out of school for the summer.    Thank you so much,  Jessica Kebede

## 2023-07-12 ENCOUNTER — HOSPITAL ENCOUNTER (OUTPATIENT)
Facility: HOSPITAL | Age: 33
Setting detail: RECURRING SERIES
Discharge: HOME | End: 2023-07-15
Payer: OTHER GOVERNMENT

## 2023-07-12 PROCEDURE — 97162 PT EVAL MOD COMPLEX 30 MIN: CPT

## 2023-07-12 PROCEDURE — 97112 NEUROMUSCULAR REEDUCATION: CPT

## 2023-07-12 PROCEDURE — 97110 THERAPEUTIC EXERCISES: CPT

## 2023-07-12 NOTE — PROGRESS NOTES
[address] [facility] [address] [facility] OO:[phone] Fx: [phone]    PLAN OF CARE/ Statement of Necessity for Physical Therapy Services           Patient name: Tonia Myers Start of Care: 2023   Referral source: Cruz Tamayo DO : 1990    Medical Diagnosis: Other low back pain [M54.59]  Neck pain [M54.2]       Onset Date: 2022    Treatment Diagnosis: M54.2  NECK PAIN and M54.59  OTHER LOWER BACK PAIN                                     Prior Hospitalization: see medical history Provider#: [id number]   Medications: Verified on Patient Summary List     Comorbidities: Hashimoto's, arthritis  Prior Level of Function: The patient reports that she had improved ease of bending down and lifting prior to onset. The Plan of Care and following information is based on the information from the initial evaluation. Assessment / key information:  The patient is a 28year old female with a chief complaint of low back pain that has an onset of about 1 year ago when her son was born. She states she had an epidural attempted 3 times, and since that time has experienced low back pain that limits her ease of lifting, and bending as well as moving quickly. She denies radicular type pain of her back. She has had radiographs of her lumbar spine which show moderate arthritis per patient report. The patient presents with signs and symptoms consistent with mechanical low back pain with impairments consisting of pain, decreased ROM, decreased flexibility, decreased strength, decreased quality of life, decreased ADL ease. The patient will benefit from skilled PT in order to address the aforementioned impairments.      Evaluation Complexity:  History:  MEDIUM  Complexity : 1-2 comorbidities / personal factors will impact the outcome/ POC ; Examination:  MEDIUM Complexity : 3 Standardized tests and measures addressin body structure,

## 2023-07-12 NOTE — PROGRESS NOTES
PHYSICAL / OCCUPATIONAL THERAPY - DAILY TREATMENT NOTE (updated )    Patient Name: Lorenza Sanchez    Date: 2023    : 1990  Insurance: Payor:  EAST / Plan: [employer] EAST / Product Type: *No Product type* /      Patient  verified Yes     Visit #   Current / Total 1 10   Time   In / Out 8:25 9:00   Pain   In / Out 5/10 5/10   Subjective Functional Status/Changes: The patient has a chief compliant of LBP and states she would like to focus on this. Changes to: Allergies, Med Hx, Sx Hx?   no       TREATMENT AREA =  Other low back pain [M54.59]  Neck pain [M54.2]    OBJECTIVE    Therapeutic Procedures: Tx Min Billable or 1:1 Min (if diff from Tx Min) Procedure, Rationale, Specifics   15  36122 Therapeutic Exercise (timed):  increase ROM, strength, coordination, balance, and proprioception to improve patient's ability to progress to PLOF and address remaining functional goals. (see flow sheet as applicable)     Details if applicable:       8  21380 Neuromuscular Re-Education (timed):  improve balance, coordination, kinesthetic sense, posture, core stability and proprioception to improve patient's ability to develop conscious control of individual muscles and awareness of position of extremities in order to progress to PLOF and address remaining functional goals.  (see flow sheet as applicable)     Details if applicable:     21  [facility] Totals Reminder: bill using total billable min of TIMED therapeutic procedures (example: do not include dry needle or estim unattended, both untimed codes, in totals to left)  8-22 min = 1 unit; 23-37 min = 2 units; 38-52 min = 3 units; 53-67 min = 4 units; 68-82 min = 5 units   Total Total     TOTAL TREATMENT TIME:        35     [x]  Patient Education billed concurrently with other procedures   [x] Review HEP    [] Progressed/Changed HEP, detail:    [] Other detail:       Objective Information/Functional Measures/Assessment    See POC    Patient will continue to

## 2023-07-24 ENCOUNTER — HOSPITAL ENCOUNTER (OUTPATIENT)
Facility: HOSPITAL | Age: 33
Setting detail: RECURRING SERIES
Discharge: HOME | End: 2023-07-27
Payer: OTHER GOVERNMENT

## 2023-07-24 PROCEDURE — 97112 NEUROMUSCULAR REEDUCATION: CPT

## 2023-07-24 PROCEDURE — 97110 THERAPEUTIC EXERCISES: CPT

## 2023-07-24 PROCEDURE — 97530 THERAPEUTIC ACTIVITIES: CPT

## 2023-07-24 NOTE — PROGRESS NOTES
See Progress Note/Recertification    Short Term Goals: To be accomplished in 2 weeks  The patient will demonstrate independence and compliance with HEP to maximize therapeutic benefit. IE: issued HEP  The patient will improve hamstring 90/90 test B to 25 degrees to reduce stress of lumbar spine. IE: 40 degrees B  Long Term Goals: To be accomplished in 5 weeks  The patient will improve FOTO score to 64 to improve quality of life. IE: 52  The patient will attain forward flexion finger tips to floor measure of mid shin to improve ease of grasping items from floor. IE: 42 cm finger tips to floor  3. The patient will improve hip ABD/EXT strength to 5/5 MMT to maximize stability in stance. IE: 3+/5 MMT B ABD, 4/5 MMT B EXT  4. The patient will demonstrate 20# KB squats with good form without increase in LBP to improve ease of caring for 2year old.               IE: pain with lifting    PLAN  Yes  Continue plan of care  []  Upgrade activities as tolerated  []  Discharge due to :  []  Other:    Teodora Yancey PTA    7/24/2023    9:19 AM    Future Appointments   Date Time Provider [address]   7/26/2023  7:50 AM Ely Bowie, PT [facility]   8/1/2023  8:30 AM Teodora Yancey PTA [facility]   8/3/2023  8:30 AM Myah Dwyer PTA [facility]   8/8/2023  8:30 AM Tiffany Bowen PT [facility]   8/10/2023  8:30 AM Tomy Nogueira PT [facility]   8/15/2023  8:30 AM Tomy Nogueira, PT [facility]   8/17/2023  7:50 AM Tiffany Bowen, PT [facility]

## 2023-07-26 ENCOUNTER — HOSPITAL ENCOUNTER (OUTPATIENT)
Facility: HOSPITAL | Age: 33
Setting detail: RECURRING SERIES
Discharge: HOME | End: 2023-07-29
Payer: OTHER GOVERNMENT

## 2023-07-26 PROCEDURE — 97112 NEUROMUSCULAR REEDUCATION: CPT

## 2023-07-26 PROCEDURE — 97110 THERAPEUTIC EXERCISES: CPT

## 2023-07-26 PROCEDURE — 97530 THERAPEUTIC ACTIVITIES: CPT

## 2023-07-26 NOTE — PROGRESS NOTES
PHYSICAL / OCCUPATIONAL THERAPY - DAILY TREATMENT NOTE (updated )    Patient Name: Estefania Reyes    Date: 2023    : 1990  Insurance: Payor:  EAST / Plan: [employer] EAST / Product Type: *No Product type* /      Patient  verified Yes     Visit #   Current / Total 3 10   Time   In / Out 7:50 8:31   Pain   In / Out 5/10 5/10   Subjective Functional Status/Changes: The patient states she tolerated the last session well, denies changes to date. Changes to: Allergies, Med Hx, Sx Hx?   no       TREATMENT AREA =  Other low back pain [M54.59]  Neck pain [M54.2]    OBJECTIVE  Therapeutic Procedures: Tx Min Billable or 1:1 Min (if diff from Tx Min) Procedure, Rationale, Specifics   21  62944 Therapeutic Exercise (timed):  increase ROM, strength, coordination, balance, and proprioception to improve patient's ability to progress to PLOF and address remaining functional goals. (see flow sheet as applicable)     Details if applicable:       12  64006 Neuromuscular Re-Education (timed):  improve balance, coordination, kinesthetic sense, posture, core stability and proprioception to improve patient's ability to develop conscious control of individual muscles and awareness of position of extremities in order to progress to PLOF and address remaining functional goals. (see flow sheet as applicable)     Details if applicable:   trap bar, PPT during supine PB   8  90895 Therapeutic Activity (timed):  use of dynamic activities replicating functional movements to increase ROM, strength, coordination, balance, and proprioception in order to improve patient's ability to progress to PLOF and address remaining functional goals. (see flow sheet as applicable)     Details if applicable:  leg lowering simulating hip hinging in supine to promote improved ease of lifting.    [address]  [address] [place] [address] Street Totals Reminder: bill using total billable min of TIMED therapeutic procedures (example: do not include dry needle or estim

## 2023-08-01 ENCOUNTER — HOSPITAL ENCOUNTER (OUTPATIENT)
Facility: HOSPITAL | Age: 33
Setting detail: RECURRING SERIES
Discharge: HOME | End: 2023-08-04
Payer: OTHER GOVERNMENT

## 2023-08-01 PROCEDURE — 97112 NEUROMUSCULAR REEDUCATION: CPT

## 2023-08-01 PROCEDURE — 97110 THERAPEUTIC EXERCISES: CPT

## 2023-08-01 PROCEDURE — 97530 THERAPEUTIC ACTIVITIES: CPT

## 2023-08-01 NOTE — PROGRESS NOTES
PHYSICAL / OCCUPATIONAL THERAPY - DAILY TREATMENT NOTE (updated )    Patient Name: Belia Caputo    Date: 2023    : 1990  Insurance: Payor:  EAST / Plan: [employer] EAST / Product Type: *No Product type* /      Patient  verified Yes     Visit #   Current / Total 4 10   Time   In / Out 8:30 9:10   Pain   In / Out 5/10 5   Subjective Functional Status/Changes: Pt reports no change in pain since SOC. Changes to: Allergies, Med Hx, Sx Hx?   no       TREATMENT AREA =  Other low back pain [M54.59]  Neck pain [M54.2]    OBJECTIVE    Therapeutic Procedures: Tx Min Billable or 1:1 Min (if diff from Tx Min) Procedure, Rationale, Specifics   22  14119 Therapeutic Exercise (timed):  increase ROM, strength, coordination, balance, and proprioception to improve patient's ability to progress to PLOF and address remaining functional goals. (see flow sheet as applicable)     Details if applicable:       10  46479 Neuromuscular Re-Education (timed):  improve balance, coordination, kinesthetic sense, posture, core stability and proprioception to improve patient's ability to develop conscious control of individual muscles and awareness of position of extremities in order to progress to PLOF and address remaining functional goals. (see flow sheet as applicable)     Details if applicable:     8  85425 Therapeutic Activity (timed):  use of dynamic activities replicating functional movements to increase ROM, strength, coordination, balance, and proprioception in order to improve patient's ability to progress to PLOF and address remaining functional goals.   (see flow sheet as applicable)     Details if applicable:     36  [facility] Totals Reminder: bill using total billable min of TIMED therapeutic procedures (example: do not include dry needle or estim unattended, both untimed codes, in totals to left)  8-22 min = 1 unit; 23-37 min = 2 units; 38-52 min = 3 units; 53-67 min = 4 units; 68-82 min = 5 units   Total

## 2023-08-03 ENCOUNTER — APPOINTMENT (OUTPATIENT)
Facility: HOSPITAL | Age: 33
End: 2023-08-03
Payer: OTHER GOVERNMENT

## 2023-08-04 ENCOUNTER — HOSPITAL ENCOUNTER (OUTPATIENT)
Facility: HOSPITAL | Age: 33
Setting detail: RECURRING SERIES
Discharge: HOME | End: 2023-08-07
Payer: OTHER GOVERNMENT

## 2023-08-04 PROCEDURE — 97110 THERAPEUTIC EXERCISES: CPT

## 2023-08-04 PROCEDURE — 97112 NEUROMUSCULAR REEDUCATION: CPT

## 2023-08-04 NOTE — PROGRESS NOTES
PHYSICAL / OCCUPATIONAL THERAPY - DAILY TREATMENT NOTE (updated )    Patient Name: Belia Caputo    Date: 2023    : 1990  Insurance: Payor: [employer] EAST / Plan: [employer] EAST / Product Type: *No Product type* /      Patient  verified Yes     Visit #   Current / Total 5 10   Time   In / Out 9:09 9:50   Pain   In / Out 5 5   Subjective Functional Status/Changes: Pt reports no new complaints of pain. Pt reports no change in symptoms. Changes to: Allergies, Med Hx, Sx Hx?   no       TREATMENT AREA =  Other low back pain [M54.59]  Neck pain [M54.2]    OBJECTIVE    Therapeutic Procedures: Tx Min Billable or 1:1 Min (if diff from Tx Min) Procedure, Rationale, Specifics   31  78076 Therapeutic Exercise (timed):  increase ROM, strength, coordination, balance, and proprioception to improve patient's ability to progress to PLOF and address remaining functional goals. (see flow sheet as applicable)     Details if applicable:       10  99807 Neuromuscular Re-Education (timed):  improve balance, coordination, kinesthetic sense, posture, core stability and proprioception to improve patient's ability to develop conscious control of individual muscles and awareness of position of extremities in order to progress to PLOF and address remaining functional goals.  (see flow sheet as applicable)     Details if applicable:     39  [facility] Totals Reminder: bill using total billable min of TIMED therapeutic procedures (example: do not include dry needle or estim unattended, both untimed codes, in totals to left)  8-22 min = 1 unit; 23-37 min = 2 units; 38-52 min = 3 units; 53-67 min = 4 units; 68-82 min = 5 units   Total Total     TOTAL TREATMENT TIME:        41     [x]  Patient Education billed concurrently with other procedures   [x] Review HEP    [] Progressed/Changed HEP, detail:    [] Other detail:       Objective Information/Functional Measures/Assessment    Pt demonstrates fair control with core strengthening

## 2023-08-08 ENCOUNTER — HOSPITAL ENCOUNTER (OUTPATIENT)
Facility: HOSPITAL | Age: 33
Setting detail: RECURRING SERIES
Discharge: HOME | End: 2023-08-11
Payer: OTHER GOVERNMENT

## 2023-08-08 ENCOUNTER — TELEPHONE (OUTPATIENT)
Age: 33
End: 2023-08-08

## 2023-08-08 DIAGNOSIS — E03.9 HYPOTHYROIDISM, UNSPECIFIED TYPE: Primary | ICD-10-CM

## 2023-08-08 PROCEDURE — 97112 NEUROMUSCULAR REEDUCATION: CPT

## 2023-08-08 PROCEDURE — 97110 THERAPEUTIC EXERCISES: CPT

## 2023-08-08 NOTE — TELEPHONE ENCOUNTER
----- Message from [facility]. Marcelo Cavazos sent at 2023  6:57 PM EDT -----  Regarding: Referral to endocrinologist   Contact: [phone]  [address] [place] [address],[address],  I was seen 2023 by the endocrinologist. I received a bill for my appointment due to the fact that the referral had  when I was seen by them. Can you please submit a referral to their office again. I was told by  rep that a referral needs to be submitted every year. I have been going to their office for about 3 years now and this is the first time Avelino had this issue. Can anything be done regarding the bill I received?    Thank you,  Kendrick Haque

## 2023-08-08 NOTE — TELEPHONE ENCOUNTER
Dr. Sabina Manriquez, please place referral order to endocrinology, Dr. Rios Pitts. Notes from 6/06/23 endo OV is scanned in media.

## 2023-08-10 ENCOUNTER — HOSPITAL ENCOUNTER (OUTPATIENT)
Facility: HOSPITAL | Age: 33
Setting detail: RECURRING SERIES
Discharge: HOME | End: 2023-08-13
Payer: OTHER GOVERNMENT

## 2023-08-10 PROCEDURE — 97110 THERAPEUTIC EXERCISES: CPT

## 2023-08-10 PROCEDURE — 97112 NEUROMUSCULAR REEDUCATION: CPT

## 2023-08-10 NOTE — PROGRESS NOTES
PHYSICAL / OCCUPATIONAL THERAPY - DAILY TREATMENT NOTE (updated )    Patient Name: Mikki Walker    Date: 8/10/2023    : 1990  Insurance: Payor: [employer] EAST / Plan: [employer] EAST / Product Type: *No Product type* /      Patient  verified Yes     Visit #   Current / Total 7 10   Time   In / Out 8:33 9:22   Pain   In / Out 5 5   Subjective Functional Status/Changes: Pt reported feeling sore today. Changes to: Allergies, Med Hx, Sx Hx?   no       TREATMENT AREA =  Other low back pain [M54.59]  Neck pain [M54.2]    OBJECTIVE    Modalities Rationale:     decrease pain and increase tissue extensibility to improve patient's ability to progress to PLOF and address remaining functional goals. 10   min  unbilled []  Ice     [x]  Heat    location/position: Lower back in prone    min []  Other:    Skin assessment post-treatment (if applicable):    []  intact    []  redness- no adverse reaction                 []redness - adverse reaction:         Therapeutic Procedures: Tx Min Billable or 1:1 Min (if diff from Tx Min) Procedure, Rationale, Specifics   24  49309 Therapeutic Exercise (timed):  increase ROM, strength, coordination, balance, and proprioception to improve patient's ability to progress to PLOF and address remaining functional goals. (see flow sheet as applicable)     Details if applicable:       15  29143 Neuromuscular Re-Education (timed):  improve balance, coordination, kinesthetic sense, posture, core stability and proprioception to improve patient's ability to develop conscious control of individual muscles and awareness of position of extremities in order to progress to PLOF and address remaining functional goals.  (see flow sheet as applicable)     Details if applicable:     44  [facility] Totals Reminder: bill using total billable min of TIMED therapeutic procedures (example: do not include dry needle or estim unattended, both untimed codes, in totals to left)  8-22 min = 1 unit; 23-37 min

## 2023-08-10 NOTE — TELEPHONE ENCOUNTER
A referral request was submitted online to [facility] requesting continuity of care consult with Dr. Errol Lombardi, beginning 6/06/23. Referral was returned by [place] advising referral request needs to be submitted by PCP - Dr. Tiesha Ferrari not listed as PCP. [employer] message has been sent to patient advising her to contact [address] [place] [address] customer service department to change PCP back to Dr. Donita Delaney. Will resubmit referral once this change has been made.

## 2023-08-15 ENCOUNTER — APPOINTMENT (OUTPATIENT)
Facility: HOSPITAL | Age: 33
End: 2023-08-15
Payer: OTHER GOVERNMENT

## 2023-08-17 ENCOUNTER — HOSPITAL ENCOUNTER (OUTPATIENT)
Facility: HOSPITAL | Age: 33
Setting detail: RECURRING SERIES
Discharge: HOME | End: 2023-08-20
Payer: OTHER GOVERNMENT

## 2023-08-17 PROCEDURE — 97140 MANUAL THERAPY 1/> REGIONS: CPT

## 2023-08-17 PROCEDURE — 97535 SELF CARE MNGMENT TRAINING: CPT

## 2023-08-17 PROCEDURE — 97110 THERAPEUTIC EXERCISES: CPT

## 2023-08-17 PROCEDURE — 97112 NEUROMUSCULAR REEDUCATION: CPT

## 2023-08-17 NOTE — PROGRESS NOTES
[address] [facility] [address] [facility] - Ph: [phone]   Fx: [phone]    PHYSICAL THERAPY PROGRESS NOTE      Patient name: Tonia Myers Start of Care: 2023   Referral source: Federico Mendez DO : 1990               Medical Diagnosis: Other low back pain [M54.59]  Neck pain [M54.2]        Onset Date: 2022               Treatment Diagnosis: M54.2  NECK PAIN and M54.59  OTHER LOWER BACK PAIN                                     Prior Hospitalization: see medical history Provider#: [id number]   Medications: Verified on Patient Summary List      Comorbidities: Hashimoto's, arthritis  Prior Level of Function: The patient reports that she had improved ease of bending down and lifting prior to onset. Visits from Start of Care: 8    Missed Visits: 0    Goals/Measure of Progress: To be achieved in 4 weeks:      Short Term Goals: To be accomplished in 2 weeks  The patient will demonstrate independence and compliance with HEP to maximize therapeutic benefit. IE: issued HEP              Current: MET on 23  The patient will improve hamstring 90/90 test B to 25 degrees to reduce stress of lumbar spine. IE: 40 degrees B              Current: MET left 19 degrees, right 17 degrees on 23  Long Term Goals: To be accomplished in 5 weeks  The patient will improve FOTO score to 64 to improve quality of life. IE: 52  The patient will attain forward flexion finger tips to floor measure of mid shin to improve ease of grasping items from floor. IE: 42 cm finger tips to floor             Current:  Progressing, 8 cms from finger tips to floor, 08/10/2023  3. The patient will improve hip ABD/EXT strength to 5/5 MMT to maximize stability in stance.                          IE: 3+/5 MMT B ABD, 4/5 MMT B EXT              Current: progressing B glute med 4+/5, extension 4/5 B on

## 2023-08-17 NOTE — PROGRESS NOTES
PHYSICAL / OCCUPATIONAL THERAPY - DAILY TREATMENT NOTE (updated )    Patient Name: Fidel Pineda    Date: 2023    : 1990  Insurance: Payor: [employer] EAST / Plan: [employer] EAST / Product Type: *No Product type* /      Patient  verified Yes     Visit #   Current / Total 8 10   Time   In / Out 0750 0840    Pain   In / Out 5 5   Subjective Functional Status/Changes: Pt reports she is doing okay. Her pain remains about a 5. Changes to: Allergies, Med Hx, Sx Hx?   no       TREATMENT AREA =  Other low back pain [M54.59]  Neck pain [M54.2]    OBJECTIVE      Therapeutic Procedures: Tx Min Billable or 1:1 Min (if diff from Tx Min) Procedure, Rationale, Specifics   24  27885 Therapeutic Exercise (timed):  increase ROM, strength, coordination, balance, and proprioception to improve patient's ability to progress to PLOF and address remaining functional goals. (see flow sheet as applicable)     Details if applicable:       10  68861 Neuromuscular Re-Education (timed):  improve balance, coordination, kinesthetic sense, posture, core stability and proprioception to improve patient's ability to develop conscious control of individual muscles and awareness of position of extremities in order to progress to PLOF and address remaining functional goals. (see flow sheet as applicable)     Details if applicable:     8  50812 Manual Therapy (timed):  decrease pain, increase ROM, increase tissue extensibility, and decrease trigger points to improve patient's ability to progress to PLOF and address remaining functional goals. The manual therapy interventions were performed at a separate and distinct time from the therapeutic activities interventions . (see flow sheet as applicable)     Details if applicable:  Gentle Graston with GT4 along B lumbar paraspinals. TPR to B lower lumbar paraspinals.  - pt in prone   8  22887 Self Care/Home Management (timed):  improve patient knowledge and understanding of pain reducing

## 2023-08-18 NOTE — TELEPHONE ENCOUNTER
A [facility] approval has been received for consult with Dr. Naomi Jimenez; Auth: [id number] beginning 6/06/23 and expires 6/05/24; valid 4 visits. The approval has been faxed to Dr. Valentino Flores. A fax confirmation has been received. Patient has been notified via [employer] approval was received for consult with Dr. Valentino Flores. Closing encounter.

## 2023-08-23 ENCOUNTER — HOSPITAL ENCOUNTER (OUTPATIENT)
Facility: HOSPITAL | Age: 33
Setting detail: RECURRING SERIES
Discharge: HOME | End: 2023-08-26
Payer: OTHER GOVERNMENT

## 2023-08-23 PROCEDURE — 97530 THERAPEUTIC ACTIVITIES: CPT

## 2023-08-23 PROCEDURE — 97110 THERAPEUTIC EXERCISES: CPT

## 2023-08-23 PROCEDURE — 97112 NEUROMUSCULAR REEDUCATION: CPT

## 2023-08-23 NOTE — PROGRESS NOTES
PHYSICAL / OCCUPATIONAL THERAPY - DAILY TREATMENT NOTE (updated )    Patient Name: Belia Caputo    Date: 2023    : 1990  Insurance: Payor:  EAST / Plan: [employer] EAST / Product Type: *No Product type* /      Patient  verified Yes     Visit #   Current / Total 9 14   Time   In / Out 7:50 8:30   Pain   In / Out 6/10 5/10   Subjective Functional Status/Changes: The patient states she is still a little more sore from Graston last visit. Changes to: Allergies, Med Hx, Sx Hx?   no       TREATMENT AREA =  Other low back pain [M54.59]  Neck pain [M54.2]    OBJECTIVE  Therapeutic Procedures: Tx Min Billable or 1:1 Min (if diff from Tx Min) Procedure, Rationale, Specifics   24  69116 Therapeutic Exercise (timed):  increase ROM, strength, coordination, balance, and proprioception to improve patient's ability to progress to PLOF and address remaining functional goals. (see flow sheet as applicable)     Details if applicable:       8  89221 Therapeutic Activity (timed):  use of dynamic activities replicating functional movements to increase ROM, strength, coordination, balance, and proprioception in order to improve patient's ability to progress to PLOF and address remaining functional goals. (see flow sheet as applicable)     Details if applicable:  lifting KB   8  08020 Neuromuscular Re-Education (timed):  improve balance, coordination, kinesthetic sense, posture, core stability and proprioception to improve patient's ability to develop conscious control of individual muscles and awareness of position of extremities in order to progress to PLOF and address remaining functional goals.  (see flow sheet as applicable)     Details if applicable:     36  [facility] Totals Reminder: bill using total billable min of TIMED therapeutic procedures (example: do not include dry needle or estim unattended, both untimed codes, in totals to left)  8-22 min = 1 unit; 23-37 min = 2 units; 38-52 min = 3 units;

## 2023-08-29 ENCOUNTER — HOSPITAL ENCOUNTER (OUTPATIENT)
Facility: HOSPITAL | Age: 33
Setting detail: RECURRING SERIES
Discharge: HOME | End: 2023-09-01
Payer: OTHER GOVERNMENT

## 2023-08-29 PROCEDURE — 20560 NDL INSJ W/O NJX 1 OR 2 MUSC: CPT

## 2023-08-29 PROCEDURE — 97535 SELF CARE MNGMENT TRAINING: CPT

## 2023-08-29 PROCEDURE — 97110 THERAPEUTIC EXERCISES: CPT

## 2023-08-29 PROCEDURE — 97140 MANUAL THERAPY 1/> REGIONS: CPT

## 2023-08-29 NOTE — PROGRESS NOTES
PHYSICAL / OCCUPATIONAL THERAPY - DAILY TREATMENT NOTE (updated )    Patient Name: Fanny Graham    Date: 2023    : 1990  Insurance: Payor:  EAST / Plan: [employer] EAST / Product Type: *No Product type* /      Patient  verified Yes     Visit #   Current / Total 10 14   Time   In / Out 0710 0800   Pain   In / Out 6-7/10 6-7/10   Subjective Functional Status/Changes: The pt reports increased pain today in the low back after being at a water park this weekend. She is requesting DN trial today as discussed previously. Changes to: Allergies, Med Hx, Sx Hx?   no       TREATMENT AREA =  Other low back pain [M54.59]  Neck pain [M54.2]    OBJECTIVE    Modalities Rationale:     decrease inflammation and decrease pain to improve patient's ability to progress to PLOF and address remaining functional goals. min [] Estim Unattended, type/location:                                      []  w/ice    []  w/heat    min [] Estim Attended, type/location:                                     []  w/US     []  w/ice    []  w/heat    []  TENS insruct      min []  Mechanical Traction: type/lbs                   []  pro   []  sup   []  int   []  cont    []  before manual    []  after manual    min []  Ultrasound, settings/location:      min []  Iontophoresis w/ dexamethasone, location:                                               []  take home patch       []  in clinic     10   min  unbilled [x]  Ice     []  Heat    location/position: Prone lumbar    min []  Paraffin,  details:     min []  Vasopneumatic Device, press/temp:     min []  Soto Wood / Dara :     If using vaso (only need to measure limb vaso being performed on)      pre-treatment girth :       post-treatment girth :       measured at (landmark location) :      min []  Other:    Skin assessment post-treatment (if applicable):    []  intact    []  redness- no adverse reaction                 []redness - adverse reaction:         Therapeutic

## 2023-09-01 ENCOUNTER — HOSPITAL ENCOUNTER (OUTPATIENT)
Facility: HOSPITAL | Age: 33
Setting detail: RECURRING SERIES
Discharge: HOME | End: 2023-09-04
Payer: OTHER GOVERNMENT

## 2023-09-01 PROCEDURE — 97110 THERAPEUTIC EXERCISES: CPT

## 2023-09-01 PROCEDURE — 97112 NEUROMUSCULAR REEDUCATION: CPT

## 2023-09-01 PROCEDURE — 97530 THERAPEUTIC ACTIVITIES: CPT

## 2023-09-01 PROCEDURE — 97140 MANUAL THERAPY 1/> REGIONS: CPT

## 2023-09-01 NOTE — PROGRESS NOTES
Therapeutic Exercise (timed):  increase ROM, strength, coordination, balance, and proprioception to improve patient's ability to progress to PLOF and address remaining functional goals. (see flow sheet as applicable)     Details if applicable:       8  89040 Neuromuscular Re-Education (timed):  improve balance, coordination, kinesthetic sense, posture, core stability and proprioception to improve patient's ability to develop conscious control of individual muscles and awareness of position of extremities in order to progress to PLOF and address remaining functional goals. (see flow sheet as applicable)     Details if applicable:     8 20206 Manual Therapy (timed):  decrease pain, increase ROM, and increase tissue extensibility to improve patient's ability to progress to PLOF and address remaining functional goals. The manual therapy interventions were performed at a separate and distinct time from the therapeutic activities interventions . (see flow sheet as applicable)     Details if applicable:     8 06113 Therapeutic Activity (timed):  use of dynamic activities replicating functional movements to increase ROM, strength, coordination, balance, and proprioception in order to improve patient's ability to progress to PLOF and address remaining functional goals. (see flow sheet as applicable)     Details if applicable:          39  [facility] Totals Reminder: bill using total billable min of TIMED therapeutic procedures (example: do not include dry needle or estim unattended, both untimed codes, in totals to left)  8-22 min = 1 unit; 23-37 min = 2 units; 38-52 min = 3 units; 53-67 min = 4 units; 68-82 min = 5 units   Total Total     TOTAL TREATMENT TIME:        46     [x]  Patient Education billed concurrently with other procedures   [x] Review HEP    [] Progressed/Changed HEP, detail:    [] Other detail:       Objective Information/Functional Measures/Assessment    The pt may benefit from continued treatment with DN.

## 2023-09-28 DIAGNOSIS — M54.9 DISCOMFORT OF BACK: ICD-10-CM

## 2023-09-28 DIAGNOSIS — M54.2 NECK PAIN: Primary | ICD-10-CM

## 2023-10-05 ENCOUNTER — TELEPHONE (OUTPATIENT)
Age: 33
End: 2023-10-05

## 2023-10-05 NOTE — TELEPHONE ENCOUNTER
Called and spoke with pt. To let her know that we would need a  referral for appt with provider 10/09. She stated she thought her PCP had put one in there and is going to give their office a call.

## 2023-10-06 ENCOUNTER — TELEPHONE (OUTPATIENT)
Age: 33
End: 2023-10-06

## 2023-10-06 NOTE — TELEPHONE ENCOUNTER
Referral submitted went to first right of refusal and PCP is listed as Gearldean Severance, DO. Made patient aware of this. She will reschedule her appointment and call  to get PCP issue resolved.

## 2023-10-06 NOTE — TELEPHONE ENCOUNTER
----- Message from Tomeka Mike sent at 10/5/2023  4:20 PM EDT -----  Subject: Message to Provider    QUESTIONS  Information for Provider? URGENT? Patient has an appt on Monday, 10/09,   with Jose Juan Salmon (ortho). Dr. Jackie Soto referred her. However, they state that   they have not received the referral. She is going to the [employer]   location. Please call patient to confirm. ---------------------------------------------------------------------------  --------------  Dorothy SCOTT  [phone]; OK to leave message on voicemail  ---------------------------------------------------------------------------  --------------  SCRIPT ANSWERS  Relationship to Patient?  Self

## 2025-03-06 ENCOUNTER — TELEPHONE (OUTPATIENT)
Facility: CLINIC | Age: 35
End: 2025-03-06

## 2025-03-06 NOTE — TELEPHONE ENCOUNTER
Dr. Hernandez, please see message below. Patient has an upcoming appointment with you on 4/22/25. Her last office visit with you was on 2/07/23. Thank you.

## 2025-03-06 NOTE — TELEPHONE ENCOUNTER
----- Message from Chanel MOISE sent at 2/28/2025 11:16 AM EST -----  Regarding: [facility] Referral Request  [facility] Referral Request    Reason for referral request: Specialty Provider    Specialist/Lab/Test patient is requesting (if known): Pain Management Provider     Specialist Phone Number (if applicable):     Additional Information . Patient would like to request a referral order to see a pain management provider for her back issues. Patient was referred to a close office recently.  --------------------------------------------------------------------------------------------------------------------------    Relationship to Patient: Self     Call Back Information: OK to leave message on voicemail  Preferred Call Back Number: Phone [phone]

## 2025-03-24 SDOH — ECONOMIC STABILITY: INCOME INSECURITY: IN THE LAST 12 MONTHS, WAS THERE A TIME WHEN YOU WERE NOT ABLE TO PAY THE MORTGAGE OR RENT ON TIME?: NO

## 2025-03-24 SDOH — ECONOMIC STABILITY: FOOD INSECURITY: WITHIN THE PAST 12 MONTHS, THE FOOD YOU BOUGHT JUST DIDN'T LAST AND YOU DIDN'T HAVE MONEY TO GET MORE.: NEVER TRUE

## 2025-03-24 SDOH — ECONOMIC STABILITY: FOOD INSECURITY: WITHIN THE PAST 12 MONTHS, YOU WORRIED THAT YOUR FOOD WOULD RUN OUT BEFORE YOU GOT MONEY TO BUY MORE.: NEVER TRUE

## 2025-03-24 SDOH — ECONOMIC STABILITY: TRANSPORTATION INSECURITY
IN THE PAST 12 MONTHS, HAS LACK OF TRANSPORTATION KEPT YOU FROM MEETINGS, WORK, OR FROM GETTING THINGS NEEDED FOR DAILY LIVING?: NO

## 2025-03-24 SDOH — ECONOMIC STABILITY: TRANSPORTATION INSECURITY
IN THE PAST 12 MONTHS, HAS THE LACK OF TRANSPORTATION KEPT YOU FROM MEDICAL APPOINTMENTS OR FROM GETTING MEDICATIONS?: NO

## 2025-03-24 NOTE — PROGRESS NOTES
\"Have you been to the ER, urgent care clinic since your last visit?  Hospitalized since your last visit?\"    NO    “Have you seen or consulted any other health care providers outside our system since your last visit?”    10/2024 - [facility] (now closed).     “Have you had a pap smear?”    Last pap smear was in 2022 by Dr. [facility]. Record has been requested.    No cervical cancer screening on file     Chief Complaint   Patient presents with    Referral - General     Pain management - was getting epidural injections for back pain - herniated discs. Previous pain management ([address]  [facility]) closed and needs new one.

## 2025-03-25 ENCOUNTER — OFFICE VISIT (OUTPATIENT)
Facility: CLINIC | Age: 35
End: 2025-03-25
Payer: OTHER GOVERNMENT

## 2025-03-25 VITALS
HEART RATE: 83 BPM | SYSTOLIC BLOOD PRESSURE: 106 MMHG | WEIGHT: 161.4 LBS | BODY MASS INDEX: 29.7 KG/M2 | HEIGHT: 62 IN | OXYGEN SATURATION: 96 % | RESPIRATION RATE: 16 BRPM | DIASTOLIC BLOOD PRESSURE: 68 MMHG | TEMPERATURE: 97.6 F

## 2025-03-25 DIAGNOSIS — Z86.39 H/O HASHIMOTO THYROIDITIS: ICD-10-CM

## 2025-03-25 DIAGNOSIS — G89.29 CHRONIC MIDLINE LOW BACK PAIN WITH LEFT-SIDED SCIATICA: Primary | ICD-10-CM

## 2025-03-25 DIAGNOSIS — D75.1 POLYCYTHEMIA: ICD-10-CM

## 2025-03-25 DIAGNOSIS — R94.6 ABNORMAL THYROID FUNCTION TEST: ICD-10-CM

## 2025-03-25 DIAGNOSIS — Z11.59 NEED FOR HEPATITIS C SCREENING TEST: ICD-10-CM

## 2025-03-25 DIAGNOSIS — Z13.220 SCREENING FOR HYPERLIPIDEMIA: ICD-10-CM

## 2025-03-25 DIAGNOSIS — M54.42 CHRONIC MIDLINE LOW BACK PAIN WITH LEFT-SIDED SCIATICA: Primary | ICD-10-CM

## 2025-03-25 DIAGNOSIS — Z13.1 SCREENING FOR DIABETES MELLITUS: ICD-10-CM

## 2025-03-25 DIAGNOSIS — Z23 NEED FOR VACCINATION: ICD-10-CM

## 2025-03-25 PROCEDURE — 90661 CCIIV3 VAC ABX FR 0.5 ML IM: CPT | Performed by: FAMILY MEDICINE

## 2025-03-25 PROCEDURE — 99214 OFFICE O/P EST MOD 30 MIN: CPT | Performed by: FAMILY MEDICINE

## 2025-03-25 PROCEDURE — 90471 IMMUNIZATION ADMIN: CPT | Performed by: FAMILY MEDICINE

## 2025-03-25 ASSESSMENT — PATIENT HEALTH QUESTIONNAIRE - PHQ9
SUM OF ALL RESPONSES TO PHQ QUESTIONS 1-9: 0
2. FEELING DOWN, DEPRESSED OR HOPELESS: NOT AT ALL
1. LITTLE INTEREST OR PLEASURE IN DOING THINGS: NOT AT ALL

## 2025-03-25 NOTE — PROGRESS NOTES
Patient presents for the following vaccines: Influenza. Patient consent obtained by patient. Patient tolerated procedure well at left deltoid. Patient advised to remain in lobby for period of 15 minutes post injection to ensure no reaction. VIS was given to patient.    Lot # [id number]  Exp: 6/30/2025  MFG: [employer]  NDC: 35954-500-87

## 2025-03-25 NOTE — PROGRESS NOTES
Mary Carmen Reyes (:  1990) is a 34 y.o. female, Established patient, here for evaluation of the following chief complaint(s):  Referral - General (Pain management - was getting epidural injections for back pain - herniated discs. Previous pain management ([address] [facility]) closed and needs new one.)         Assessment & Plan  1. Back pain.  - Pain is currently rated at 7-8/10 and sometimes radiates to the left leg causing numbness.  - X-ray from 2024 showed no acute findings, only degenerative changes and a fused C3-C4 vertebrae.  - [facility] has been initiated.  - Advised to continue using a heating pad, Tylenol as needed, and Advil with food to prevent gastrointestinal upset.    2. Elevated hemoglobin and hematocrit.  - Previous CBC showed elevated hemoglobin and hematocrit levels.  - Laboratory tests will be rechecked to assess current status, as these levels could be attributed to inflammation and may have resolved.    3. Abnormal thyroid function and history of Hashimoto's disease.  - Currently under the care of Dr. Rosales, with an appointment scheduled for 2025.  - Laboratory tests will be conducted to monitor thyroid function, including liver, kidney function, blood count, A1c, and cholesterol.  - Advised to bring a copy of these results to the appointment with Dr. Rosales.    4. Health maintenance.  - Screening laboratory tests for hepatitis C, diabetes, and cholesterol will be performed.  - Scheduled for a physical examination in 2025, during which a Pap smear will be conducted.  - If available, she will receive the influenza vaccine today.    Pt understood and agree with the plan     Results  Labs   - Hemoglobin: High   - Hematocrit: High   - TSH: Low  CMP:  Lab Results   Component Value Date/Time     2020 11:53 AM    K 4.2 2020 11:53 AM     2020 11:53 AM    CO2 28 2020

## 2025-04-14 ENCOUNTER — HOSPITAL ENCOUNTER (OUTPATIENT)
Facility: HOSPITAL | Age: 35
Discharge: HOME | End: 2025-04-17
Payer: OTHER GOVERNMENT

## 2025-04-14 DIAGNOSIS — Z13.220 SCREENING FOR HYPERLIPIDEMIA: ICD-10-CM

## 2025-04-14 DIAGNOSIS — D75.1 POLYCYTHEMIA: ICD-10-CM

## 2025-04-14 DIAGNOSIS — Z11.59 NEED FOR HEPATITIS C SCREENING TEST: ICD-10-CM

## 2025-04-14 DIAGNOSIS — Z86.39 H/O HASHIMOTO THYROIDITIS: ICD-10-CM

## 2025-04-14 DIAGNOSIS — M54.42 CHRONIC MIDLINE LOW BACK PAIN WITH LEFT-SIDED SCIATICA: ICD-10-CM

## 2025-04-14 DIAGNOSIS — R94.6 ABNORMAL THYROID FUNCTION TEST: ICD-10-CM

## 2025-04-14 DIAGNOSIS — Z13.1 SCREENING FOR DIABETES MELLITUS: ICD-10-CM

## 2025-04-14 DIAGNOSIS — G89.29 CHRONIC MIDLINE LOW BACK PAIN WITH LEFT-SIDED SCIATICA: ICD-10-CM

## 2025-04-14 LAB
ALBUMIN SERPL-MCNC: 3.7 G/DL (ref 3.4–5)
ALBUMIN/GLOB SERPL: 1.1 (ref 0.8–1.7)
ALP SERPL-CCNC: 69 U/L (ref 45–117)
ALT SERPL-CCNC: 21 U/L (ref 13–56)
ANION GAP SERPL CALC-SCNC: 5 MMOL/L (ref 3–18)
AST SERPL-CCNC: 14 U/L (ref 10–38)
BASOPHILS # BLD: 0.05 K/UL (ref 0–0.1)
BASOPHILS NFR BLD: 0.9 % (ref 0–2)
BILIRUB SERPL-MCNC: 0.3 MG/DL (ref 0.2–1)
BUN SERPL-MCNC: 13 MG/DL (ref 7–18)
BUN/CREAT SERPL: 17 (ref 12–20)
CALCIUM SERPL-MCNC: 9.6 MG/DL (ref 8.5–10.1)
CHLORIDE SERPL-SCNC: 108 MMOL/L (ref 100–111)
CHOLEST SERPL-MCNC: 196 MG/DL
CO2 SERPL-SCNC: 26 MMOL/L (ref 21–32)
CREAT SERPL-MCNC: 0.78 MG/DL (ref 0.6–1.3)
DIFFERENTIAL METHOD BLD: NORMAL
EOSINOPHIL # BLD: 0.1 K/UL (ref 0–0.4)
EOSINOPHIL NFR BLD: 1.9 % (ref 0–5)
ERYTHROCYTE [DISTWIDTH] IN BLOOD BY AUTOMATED COUNT: 12.8 % (ref 11.6–14.5)
EST. AVERAGE GLUCOSE BLD GHB EST-MCNC: 103 MG/DL
GLOBULIN SER CALC-MCNC: 3.3 G/DL (ref 2–4)
GLUCOSE SERPL-MCNC: 94 MG/DL (ref 74–99)
HBA1C MFR BLD: 5.2 % (ref 4.2–5.6)
HCT VFR BLD AUTO: 44.3 % (ref 35–45)
HCV AB SER IA-ACNC: 0.11 INDEX
HCV AB SERPL QL IA: NEGATIVE
HDLC SERPL-MCNC: 54 MG/DL (ref 40–60)
HDLC SERPL: 3.6 (ref 0–5)
HEPATITIS C COMMENT: NORMAL
HGB BLD-MCNC: 14 G/DL (ref 12–16)
IMM GRANULOCYTES # BLD AUTO: 0.02 K/UL (ref 0–0.04)
IMM GRANULOCYTES NFR BLD AUTO: 0.4 % (ref 0–0.5)
LDLC SERPL CALC-MCNC: 116.4 MG/DL (ref 0–100)
LIPID PANEL: ABNORMAL
LYMPHOCYTES # BLD: 2 K/UL (ref 0.9–3.6)
LYMPHOCYTES NFR BLD: 37.2 % (ref 21–52)
MCH RBC QN AUTO: 29.9 PG (ref 24–34)
MCHC RBC AUTO-ENTMCNC: 31.6 G/DL (ref 31–37)
MCV RBC AUTO: 94.7 FL (ref 78–100)
MONOCYTES # BLD: 0.35 K/UL (ref 0.05–1.2)
MONOCYTES NFR BLD: 6.5 % (ref 3–10)
NEUTS SEG # BLD: 2.85 K/UL (ref 1.8–8)
NEUTS SEG NFR BLD: 53.1 % (ref 40–73)
NRBC # BLD: 0 K/UL (ref 0–0.01)
NRBC BLD-RTO: 0 PER 100 WBC
PLATELET # BLD AUTO: 269 K/UL (ref 135–420)
PMV BLD AUTO: 10.9 FL (ref 9.2–11.8)
POTASSIUM SERPL-SCNC: 4.2 MMOL/L (ref 3.5–5.5)
PROT SERPL-MCNC: 7 G/DL (ref 6.4–8.2)
RBC # BLD AUTO: 4.68 M/UL (ref 4.2–5.3)
SODIUM SERPL-SCNC: 139 MMOL/L (ref 136–145)
T4 FREE SERPL-MCNC: 0.9 NG/DL (ref 0.7–1.5)
TRIGL SERPL-MCNC: 128 MG/DL
TSH SERPL DL<=0.05 MIU/L-ACNC: 3.74 UIU/ML (ref 0.36–3.74)
VLDLC SERPL CALC-MCNC: 25.6 MG/DL
WBC # BLD AUTO: 5.4 K/UL (ref 4.6–13.2)

## 2025-04-14 PROCEDURE — 80053 COMPREHEN METABOLIC PANEL: CPT

## 2025-04-14 PROCEDURE — 85025 COMPLETE CBC W/AUTO DIFF WBC: CPT

## 2025-04-14 PROCEDURE — 36415 COLL VENOUS BLD VENIPUNCTURE: CPT

## 2025-04-14 PROCEDURE — 83036 HEMOGLOBIN GLYCOSYLATED A1C: CPT

## 2025-04-14 PROCEDURE — 80061 LIPID PANEL: CPT

## 2025-04-14 PROCEDURE — 86803 HEPATITIS C AB TEST: CPT

## 2025-04-14 PROCEDURE — 84439 ASSAY OF FREE THYROXINE: CPT

## 2025-04-14 PROCEDURE — 84443 ASSAY THYROID STIM HORMONE: CPT

## 2025-04-15 ENCOUNTER — RESULTS FOLLOW-UP (OUTPATIENT)
Facility: CLINIC | Age: 35
End: 2025-04-15

## 2025-04-22 ENCOUNTER — OFFICE VISIT (OUTPATIENT)
Facility: CLINIC | Age: 35
End: 2025-04-22
Payer: OTHER GOVERNMENT

## 2025-04-22 ENCOUNTER — HOSPITAL ENCOUNTER (OUTPATIENT)
Facility: HOSPITAL | Age: 35
Setting detail: SPECIMEN
Discharge: HOME | End: 2025-04-25
Payer: OTHER GOVERNMENT

## 2025-04-22 VITALS
SYSTOLIC BLOOD PRESSURE: 106 MMHG | DIASTOLIC BLOOD PRESSURE: 68 MMHG | HEIGHT: 62 IN | BODY MASS INDEX: 29.81 KG/M2 | HEART RATE: 82 BPM | TEMPERATURE: 97.9 F | OXYGEN SATURATION: 97 % | WEIGHT: 162 LBS | RESPIRATION RATE: 16 BRPM

## 2025-04-22 DIAGNOSIS — Z12.4 SCREENING FOR CERVICAL CANCER: ICD-10-CM

## 2025-04-22 DIAGNOSIS — Z87.42 H/O ABNORMAL CERVICAL PAPANICOLAOU SMEAR: ICD-10-CM

## 2025-04-22 DIAGNOSIS — Z00.00 ENCOUNTER FOR WELL ADULT EXAM WITHOUT ABNORMAL FINDINGS: Primary | ICD-10-CM

## 2025-04-22 DIAGNOSIS — Z80.3 FAMILY HISTORY OF BREAST CANCER: ICD-10-CM

## 2025-04-22 DIAGNOSIS — Z12.31 ENCOUNTER FOR SCREENING MAMMOGRAM FOR MALIGNANT NEOPLASM OF BREAST: ICD-10-CM

## 2025-04-22 PROCEDURE — 87624 HPV HI-RISK TYP POOLED RSLT: CPT

## 2025-04-22 PROCEDURE — 99395 PREV VISIT EST AGE 18-39: CPT | Performed by: FAMILY MEDICINE

## 2025-04-22 PROCEDURE — 88175 CYTOPATH C/V AUTO FLUID REDO: CPT

## 2025-04-22 RX ORDER — METHOCARBAMOL 500 MG/1
TABLET, FILM COATED ORAL
COMMUNITY
Start: 2025-04-03

## 2025-04-22 SDOH — ECONOMIC STABILITY: FOOD INSECURITY: WITHIN THE PAST 12 MONTHS, YOU WORRIED THAT YOUR FOOD WOULD RUN OUT BEFORE YOU GOT MONEY TO BUY MORE.: NEVER TRUE

## 2025-04-22 SDOH — ECONOMIC STABILITY: FOOD INSECURITY: WITHIN THE PAST 12 MONTHS, THE FOOD YOU BOUGHT JUST DIDN'T LAST AND YOU DIDN'T HAVE MONEY TO GET MORE.: NEVER TRUE

## 2025-04-22 NOTE — PROGRESS NOTES
Well Adult Note  Name: Mary Carmen Reyes Today’s Date: 2025   MRN: [id number] Sex: Female   Age: 34 y.o. Ethnicity:  /    : 1990 Race:  /       Mary Carmen Reyes is here for a well adult exam.          Assessment & Plan  1. Health maintenance.  - Last Pap smear conducted in .  - Mostly caught up with vaccines except for COVID-19 booster; no records of hepatitis B and varicella vaccines.  - Mammogram ordered due to family history of breast cancer.  - Pap smear performed during today's visit; advised to complete hepatitis B and varicella vaccine series at the health department or check titers if preferred.    2. Back pain.  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  - Physical exam: sitting comfortably without acute distress; lungs clear; heart regular; abdomen nontender; extremities no edema; behavior normal; neurologically awake, alert, oriented to time, place, and person.  - Advised to incorporate intermittent walking into daily routine, such as walking for 10 to 15 minutes after each meal.    3. Weight gain.  - Reports significant weight gain and difficulty managing it due to back pain and sedentary job.  - Recent lab results (2025): no signs of anemia; blood count, liver, kidney function, electrolytes within normal limits; A1c within normal limits;  (borderline high).  - Advised to maintain a low-fat, low-carb diet with portion control; recommended keeping a diet diary and calorie count.    4. Thyroid management.  - Thyroid function tests within normal limits.  - Current dose of thyroid medication will be maintained.  - Discussed fatigue and sleep disturbances; advised to reduce water intake later in the day, avoid looking at the time upon waking, and return to sleep promptly; avoid late dinners and late-night exercise.      5. Adult physical : see above and HPI     Pt understood and agree with the plan     Follow-up  - Follow-up in 6 months for

## 2025-04-22 NOTE — PROGRESS NOTES
\"Have you been to the ER, urgent care clinic since your last visit?  Hospitalized since your last visit?\"    NO    “Have you seen or consulted any other health care providers outside our system since your last visit?”    Telemedicine virtual, LOV: 4/03/25 Dr. Avelar  at [place], [place].    “Have you had a pap smear?”    [facility] in 2022. Pap record has been requested.    No cervical cancer screening on file              Chief Complaint   Patient presents with    Annual Exam

## 2025-04-25 ENCOUNTER — RESULTS FOLLOW-UP (OUTPATIENT)
Facility: CLINIC | Age: 35
End: 2025-04-25

## 2025-04-25 LAB — HPV I/H RISK 1 DNA CVX QL PROBE+SIG AMP: NEGATIVE

## 2025-07-21 ENCOUNTER — RESULTS FOLLOW-UP (OUTPATIENT)
Facility: CLINIC | Age: 35
End: 2025-07-21

## 2025-07-22 NOTE — TELEPHONE ENCOUNTER
Dear Ms. Reyes:    You will need to contact [facility] MRI department directly, at [phone], to request a CD for the MRI that was completed. Have a great evening!    Sincerely,  Meg Mondragon CMA